# Patient Record
Sex: FEMALE | Race: WHITE | NOT HISPANIC OR LATINO | Employment: PART TIME | URBAN - METROPOLITAN AREA
[De-identification: names, ages, dates, MRNs, and addresses within clinical notes are randomized per-mention and may not be internally consistent; named-entity substitution may affect disease eponyms.]

---

## 2017-01-04 ENCOUNTER — HOSPITAL ENCOUNTER (OUTPATIENT)
Dept: RADIOLOGY | Facility: CLINIC | Age: 32
Discharge: HOME | End: 2017-01-04
Payer: COMMERCIAL

## 2017-01-04 ENCOUNTER — TRANSCRIBE ORDERS (OUTPATIENT)
Dept: RADIOLOGY | Facility: CLINIC | Age: 32
End: 2017-01-04

## 2017-01-04 DIAGNOSIS — M20.12 ACQUIRED HALLUX VALGUS OF LEFT FOOT: ICD-10-CM

## 2017-01-04 DIAGNOSIS — M20.12 ACQUIRED HALLUX VALGUS OF LEFT FOOT: Primary | ICD-10-CM

## 2017-01-04 DIAGNOSIS — M20.11 ACQUIRED HALLUX VALGUS OF RIGHT FOOT: ICD-10-CM

## 2017-01-04 PROCEDURE — 73630 X-RAY EXAM OF FOOT: CPT

## 2017-01-18 ENCOUNTER — ALLSCRIPTS OFFICE VISIT (OUTPATIENT)
Dept: OTHER | Facility: OTHER | Age: 32
End: 2017-01-18

## 2017-02-13 ENCOUNTER — LAB REQUISITION (OUTPATIENT)
Dept: LAB | Facility: HOSPITAL | Age: 32
End: 2017-02-13
Payer: COMMERCIAL

## 2017-02-13 ENCOUNTER — ALLSCRIPTS OFFICE VISIT (OUTPATIENT)
Dept: OTHER | Facility: OTHER | Age: 32
End: 2017-02-13

## 2017-02-13 ENCOUNTER — GENERIC CONVERSION - ENCOUNTER (OUTPATIENT)
Dept: OTHER | Facility: OTHER | Age: 32
End: 2017-02-13

## 2017-02-13 DIAGNOSIS — R68.89 OTHER GENERAL SYMPTOMS AND SIGNS: ICD-10-CM

## 2017-02-13 DIAGNOSIS — R05.9 COUGH: ICD-10-CM

## 2017-02-13 LAB
FLUAV AG SPEC QL IA: NEGATIVE
FLUAV AG SPEC QL: NORMAL
FLUBV AG SPEC QL IA: NEGATIVE
FLUBV AG SPEC QL: NORMAL
RSV B RNA SPEC QL NAA+PROBE: NORMAL

## 2017-02-13 PROCEDURE — 87798 DETECT AGENT NOS DNA AMP: CPT | Performed by: INTERNAL MEDICINE

## 2017-02-13 PROCEDURE — 87400 INFLUENZA A/B EACH AG IA: CPT | Performed by: INTERNAL MEDICINE

## 2017-02-15 RX ORDER — BUTALBITAL, ACETAMINOPHEN AND CAFFEINE 50; 325; 40 MG/1; MG/1; MG/1
TABLET ORAL
COMMUNITY
Start: 2016-08-23 | End: 2020-03-02

## 2017-02-15 RX ORDER — TOPIRAMATE 50 MG/1
TABLET, FILM COATED ORAL
COMMUNITY
Start: 2016-09-19 | End: 2019-10-31 | Stop reason: ALTCHOICE

## 2017-02-17 ENCOUNTER — ANESTHESIA EVENT (OUTPATIENT)
Dept: PERIOP | Facility: HOSPITAL | Age: 32
End: 2017-02-17
Payer: COMMERCIAL

## 2017-02-17 ENCOUNTER — ANESTHESIA (OUTPATIENT)
Dept: PERIOP | Facility: HOSPITAL | Age: 32
End: 2017-02-17
Payer: COMMERCIAL

## 2017-02-17 ENCOUNTER — HOSPITAL ENCOUNTER (OUTPATIENT)
Facility: HOSPITAL | Age: 32
Setting detail: OUTPATIENT SURGERY
Discharge: HOME | End: 2017-02-17
Attending: PODIATRIST | Admitting: PODIATRIST
Payer: COMMERCIAL

## 2017-02-17 ENCOUNTER — APPOINTMENT (OUTPATIENT)
Dept: RADIOLOGY | Facility: HOSPITAL | Age: 32
End: 2017-02-17
Payer: COMMERCIAL

## 2017-02-17 VITALS
RESPIRATION RATE: 18 BRPM | SYSTOLIC BLOOD PRESSURE: 118 MMHG | DIASTOLIC BLOOD PRESSURE: 70 MMHG | BODY MASS INDEX: 23 KG/M2 | OXYGEN SATURATION: 100 % | HEIGHT: 62 IN | HEART RATE: 72 BPM | TEMPERATURE: 98.7 F | WEIGHT: 125 LBS

## 2017-02-17 DIAGNOSIS — M20.12 ACQUIRED HALLUX VALGUS OF LEFT FOOT: ICD-10-CM

## 2017-02-17 LAB — EXT PREGNANCY TEST URINE: NEGATIVE

## 2017-02-17 PROCEDURE — 88304 TISSUE EXAM BY PATHOLOGIST: CPT | Performed by: PODIATRIST

## 2017-02-17 PROCEDURE — 73630 X-RAY EXAM OF FOOT: CPT

## 2017-02-17 PROCEDURE — 81025 URINE PREGNANCY TEST: CPT | Performed by: PODIATRIST

## 2017-02-17 PROCEDURE — C1713 ANCHOR/SCREW BN/BN,TIS/BN: HCPCS | Performed by: PODIATRIST

## 2017-02-17 PROCEDURE — C1769 GUIDE WIRE: HCPCS | Performed by: PODIATRIST

## 2017-02-17 DEVICE — SCREW COMP 3.5 X 22MM MINI FT: Type: IMPLANTABLE DEVICE | Status: FUNCTIONAL

## 2017-02-17 DEVICE — IMPLANTABLE DEVICE: Type: IMPLANTABLE DEVICE | Status: FUNCTIONAL

## 2017-02-17 RX ORDER — PROPOFOL 10 MG/ML
INJECTION, EMULSION INTRAVENOUS AS NEEDED
Status: DISCONTINUED | OUTPATIENT
Start: 2017-02-17 | End: 2017-02-17 | Stop reason: SURG

## 2017-02-17 RX ORDER — BUPIVACAINE HYDROCHLORIDE 2.5 MG/ML
INJECTION, SOLUTION EPIDURAL; INFILTRATION; INTRACAUDAL AS NEEDED
Status: DISCONTINUED | OUTPATIENT
Start: 2017-02-17 | End: 2017-02-17 | Stop reason: HOSPADM

## 2017-02-17 RX ORDER — SODIUM CHLORIDE, SODIUM LACTATE, POTASSIUM CHLORIDE, CALCIUM CHLORIDE 600; 310; 30; 20 MG/100ML; MG/100ML; MG/100ML; MG/100ML
INJECTION, SOLUTION INTRAVENOUS CONTINUOUS PRN
Status: DISCONTINUED | OUTPATIENT
Start: 2017-02-17 | End: 2017-02-17 | Stop reason: SURG

## 2017-02-17 RX ORDER — ACETAMINOPHEN 325 MG/1
650 TABLET ORAL EVERY 4 HOURS PRN
Status: DISCONTINUED | OUTPATIENT
Start: 2017-02-17 | End: 2017-02-17 | Stop reason: HOSPADM

## 2017-02-17 RX ORDER — SODIUM CHLORIDE, SODIUM LACTATE, POTASSIUM CHLORIDE, CALCIUM CHLORIDE 600; 310; 30; 20 MG/100ML; MG/100ML; MG/100ML; MG/100ML
20 INJECTION, SOLUTION INTRAVENOUS CONTINUOUS
Status: DISCONTINUED | OUTPATIENT
Start: 2017-02-17 | End: 2017-02-17 | Stop reason: HOSPADM

## 2017-02-17 RX ORDER — MIDAZOLAM HYDROCHLORIDE 1 MG/ML
INJECTION INTRAMUSCULAR; INTRAVENOUS AS NEEDED
Status: DISCONTINUED | OUTPATIENT
Start: 2017-02-17 | End: 2017-02-17 | Stop reason: SURG

## 2017-02-17 RX ORDER — ONDANSETRON 2 MG/ML
4 INJECTION INTRAMUSCULAR; INTRAVENOUS EVERY 6 HOURS PRN
Status: DISCONTINUED | OUTPATIENT
Start: 2017-02-17 | End: 2017-02-17 | Stop reason: HOSPADM

## 2017-02-17 RX ORDER — CLINDAMYCIN PHOSPHATE 900 MG/50ML
900 INJECTION INTRAVENOUS ONCE
Status: DISCONTINUED | OUTPATIENT
Start: 2017-02-17 | End: 2017-02-17 | Stop reason: HOSPADM

## 2017-02-17 RX ORDER — PROPOFOL 10 MG/ML
INJECTION, EMULSION INTRAVENOUS CONTINUOUS PRN
Status: DISCONTINUED | OUTPATIENT
Start: 2017-02-17 | End: 2017-02-17 | Stop reason: SURG

## 2017-02-17 RX ORDER — FENTANYL CITRATE 50 UG/ML
INJECTION, SOLUTION INTRAMUSCULAR; INTRAVENOUS AS NEEDED
Status: DISCONTINUED | OUTPATIENT
Start: 2017-02-17 | End: 2017-02-17 | Stop reason: SURG

## 2017-02-17 RX ORDER — FENTANYL CITRATE/PF 50 MCG/ML
25 SYRINGE (ML) INJECTION
Status: DISCONTINUED | OUTPATIENT
Start: 2017-02-17 | End: 2017-02-17 | Stop reason: HOSPADM

## 2017-02-17 RX ORDER — CLINDAMYCIN PHOSPHATE 150 MG/ML
INJECTION, SOLUTION INTRAVENOUS AS NEEDED
Status: DISCONTINUED | OUTPATIENT
Start: 2017-02-17 | End: 2017-02-17 | Stop reason: SURG

## 2017-02-17 RX ORDER — ONDANSETRON 2 MG/ML
INJECTION INTRAMUSCULAR; INTRAVENOUS AS NEEDED
Status: DISCONTINUED | OUTPATIENT
Start: 2017-02-17 | End: 2017-02-17 | Stop reason: SURG

## 2017-02-17 RX ORDER — OXYCODONE HYDROCHLORIDE 5 MG/1
10 TABLET ORAL EVERY 6 HOURS PRN
Status: DISCONTINUED | OUTPATIENT
Start: 2017-02-17 | End: 2017-02-17 | Stop reason: HOSPADM

## 2017-02-17 RX ORDER — OXYCODONE HYDROCHLORIDE AND ACETAMINOPHEN 5; 325 MG/1; MG/1
TABLET ORAL
Qty: 30 TABLET | Refills: 0 | Status: SHIPPED | OUTPATIENT
Start: 2017-02-17 | End: 2019-10-31 | Stop reason: ALTCHOICE

## 2017-02-17 RX ORDER — OXYCODONE HYDROCHLORIDE 5 MG/1
5 TABLET ORAL EVERY 4 HOURS PRN
Status: DISCONTINUED | OUTPATIENT
Start: 2017-02-17 | End: 2017-02-17 | Stop reason: HOSPADM

## 2017-02-17 RX ORDER — ASPIRIN 325 MG
325 TABLET, DELAYED RELEASE (ENTERIC COATED) ORAL 2 TIMES DAILY
Qty: 60 TABLET | Refills: 0 | Status: SHIPPED | OUTPATIENT
Start: 2017-02-17 | End: 2019-10-31 | Stop reason: ALTCHOICE

## 2017-02-17 RX ORDER — CETIRIZINE HYDROCHLORIDE 10 MG/1
10 TABLET ORAL DAILY
COMMUNITY
End: 2019-10-31 | Stop reason: ALTCHOICE

## 2017-02-17 RX ORDER — KETOROLAC TROMETHAMINE 30 MG/ML
INJECTION, SOLUTION INTRAMUSCULAR; INTRAVENOUS AS NEEDED
Status: DISCONTINUED | OUTPATIENT
Start: 2017-02-17 | End: 2017-02-17 | Stop reason: SURG

## 2017-02-17 RX ADMIN — ONDANSETRON 4 MG: 2 INJECTION INTRAMUSCULAR; INTRAVENOUS at 16:02

## 2017-02-17 RX ADMIN — KETOROLAC TROMETHAMINE 30 MG: 30 INJECTION, SOLUTION INTRAMUSCULAR at 16:02

## 2017-02-17 RX ADMIN — MIDAZOLAM HYDROCHLORIDE 2 MG: 1 INJECTION, SOLUTION INTRAMUSCULAR; INTRAVENOUS at 13:55

## 2017-02-17 RX ADMIN — MIDAZOLAM HYDROCHLORIDE 1 MG: 1 INJECTION, SOLUTION INTRAMUSCULAR; INTRAVENOUS at 14:32

## 2017-02-17 RX ADMIN — SODIUM CHLORIDE, SODIUM LACTATE, POTASSIUM CHLORIDE, AND CALCIUM CHLORIDE: .6; .31; .03; .02 INJECTION, SOLUTION INTRAVENOUS at 13:56

## 2017-02-17 RX ADMIN — FENTANYL CITRATE 50 MCG: 50 INJECTION, SOLUTION INTRAMUSCULAR; INTRAVENOUS at 14:22

## 2017-02-17 RX ADMIN — CLINDAMYCIN PHOSPHATE 900 MG: 150 INJECTION, SOLUTION INTRAMUSCULAR; INTRAVENOUS at 14:21

## 2017-02-17 RX ADMIN — FENTANYL CITRATE 50 MCG: 50 INJECTION, SOLUTION INTRAMUSCULAR; INTRAVENOUS at 14:24

## 2017-02-17 RX ADMIN — PROPOFOL 20 MG: 10 INJECTION, EMULSION INTRAVENOUS at 14:31

## 2017-02-17 RX ADMIN — PROPOFOL 80 MCG/KG/MIN: 10 INJECTION, EMULSION INTRAVENOUS at 14:24

## 2017-02-17 RX ADMIN — MIDAZOLAM HYDROCHLORIDE 1 MG: 1 INJECTION, SOLUTION INTRAMUSCULAR; INTRAVENOUS at 14:27

## 2017-02-17 RX ADMIN — PROPOFOL 30 MG: 10 INJECTION, EMULSION INTRAVENOUS at 14:32

## 2017-03-06 ENCOUNTER — HOSPITAL ENCOUNTER (OUTPATIENT)
Dept: RADIOLOGY | Facility: CLINIC | Age: 32
Discharge: HOME | End: 2017-03-06
Payer: COMMERCIAL

## 2017-03-06 ENCOUNTER — TRANSCRIBE ORDERS (OUTPATIENT)
Dept: ADMINISTRATIVE | Facility: HOSPITAL | Age: 32
End: 2017-03-06

## 2017-03-06 DIAGNOSIS — M20.12 ACQUIRED HALLUX VALGUS OF LEFT FOOT: Primary | ICD-10-CM

## 2017-03-06 DIAGNOSIS — M20.12 ACQUIRED HALLUX VALGUS OF LEFT FOOT: ICD-10-CM

## 2017-03-06 PROCEDURE — 73630 X-RAY EXAM OF FOOT: CPT

## 2017-03-29 ENCOUNTER — HOSPITAL ENCOUNTER (OUTPATIENT)
Dept: RADIOLOGY | Facility: CLINIC | Age: 32
Discharge: HOME | End: 2017-03-29
Payer: COMMERCIAL

## 2017-03-29 ENCOUNTER — TRANSCRIBE ORDERS (OUTPATIENT)
Dept: RADIOLOGY | Facility: CLINIC | Age: 32
End: 2017-03-29

## 2017-03-29 DIAGNOSIS — M20.12 ACQUIRED HALLUX VALGUS OF LEFT FOOT: ICD-10-CM

## 2017-03-29 DIAGNOSIS — M20.12 ACQUIRED HALLUX VALGUS OF LEFT FOOT: Primary | ICD-10-CM

## 2017-03-29 PROCEDURE — 73630 X-RAY EXAM OF FOOT: CPT

## 2017-03-31 ENCOUNTER — GENERIC CONVERSION - ENCOUNTER (OUTPATIENT)
Dept: OTHER | Facility: OTHER | Age: 32
End: 2017-03-31

## 2017-04-19 ENCOUNTER — TRANSCRIBE ORDERS (OUTPATIENT)
Dept: RADIOLOGY | Facility: CLINIC | Age: 32
End: 2017-04-19

## 2017-04-19 ENCOUNTER — HOSPITAL ENCOUNTER (OUTPATIENT)
Dept: RADIOLOGY | Facility: CLINIC | Age: 32
Discharge: HOME | End: 2017-04-19
Payer: COMMERCIAL

## 2017-04-19 DIAGNOSIS — M20.12: ICD-10-CM

## 2017-04-19 DIAGNOSIS — M20.12: Primary | ICD-10-CM

## 2017-04-19 PROCEDURE — 73630 X-RAY EXAM OF FOOT: CPT

## 2017-05-10 ENCOUNTER — HOSPITAL ENCOUNTER (OUTPATIENT)
Dept: RADIOLOGY | Facility: CLINIC | Age: 32
Discharge: HOME | End: 2017-05-10
Payer: COMMERCIAL

## 2017-05-10 PROCEDURE — 73630 X-RAY EXAM OF FOOT: CPT

## 2017-08-15 ENCOUNTER — TRANSCRIBE ORDERS (OUTPATIENT)
Dept: ADMINISTRATIVE | Facility: HOSPITAL | Age: 32
End: 2017-08-15

## 2017-08-15 DIAGNOSIS — Z34.90 PRENATAL CARE, UNSPECIFIED TRIMESTER: Primary | ICD-10-CM

## 2017-08-19 ENCOUNTER — HOSPITAL ENCOUNTER (OUTPATIENT)
Dept: ULTRASOUND IMAGING | Facility: HOSPITAL | Age: 32
Discharge: HOME | End: 2017-08-19
Payer: COMMERCIAL

## 2017-08-19 DIAGNOSIS — Z34.90 PRENATAL CARE, UNSPECIFIED TRIMESTER: ICD-10-CM

## 2017-08-19 PROCEDURE — 76801 OB US < 14 WKS SINGLE FETUS: CPT

## 2017-09-17 ENCOUNTER — OFFICE VISIT (OUTPATIENT)
Dept: URGENT CARE | Facility: CLINIC | Age: 32
End: 2017-09-17
Payer: COMMERCIAL

## 2017-09-17 PROCEDURE — 99203 OFFICE O/P NEW LOW 30 MIN: CPT

## 2017-10-10 ENCOUNTER — TRANSCRIBE ORDERS (OUTPATIENT)
Dept: ADMINISTRATIVE | Facility: HOSPITAL | Age: 32
End: 2017-10-10

## 2017-10-10 DIAGNOSIS — Z34.82 PRENATAL CARE, SUBSEQUENT PREGNANCY, SECOND TRIMESTER: Primary | ICD-10-CM

## 2017-10-18 ENCOUNTER — GENERIC CONVERSION - ENCOUNTER (OUTPATIENT)
Dept: OTHER | Facility: OTHER | Age: 32
End: 2017-10-18

## 2017-10-31 ENCOUNTER — HOSPITAL ENCOUNTER (OUTPATIENT)
Dept: ULTRASOUND IMAGING | Facility: HOSPITAL | Age: 32
Discharge: HOME | End: 2017-10-31
Payer: COMMERCIAL

## 2017-10-31 DIAGNOSIS — Z34.82 ENCOUNTER FOR SUPERVISION OF OTHER NORMAL PREGNANCY IN SECOND TRIMESTER: ICD-10-CM

## 2017-10-31 DIAGNOSIS — Z34.82 PRENATAL CARE, SUBSEQUENT PREGNANCY, SECOND TRIMESTER: ICD-10-CM

## 2017-10-31 PROCEDURE — 76805 OB US >/= 14 WKS SNGL FETUS: CPT

## 2018-01-14 VITALS
DIASTOLIC BLOOD PRESSURE: 78 MMHG | WEIGHT: 130 LBS | BODY MASS INDEX: 22.2 KG/M2 | HEIGHT: 64 IN | RESPIRATION RATE: 16 BRPM | HEART RATE: 72 BPM | SYSTOLIC BLOOD PRESSURE: 128 MMHG

## 2018-01-14 VITALS
SYSTOLIC BLOOD PRESSURE: 118 MMHG | WEIGHT: 128 LBS | BODY MASS INDEX: 21.85 KG/M2 | RESPIRATION RATE: 16 BRPM | TEMPERATURE: 98.4 F | DIASTOLIC BLOOD PRESSURE: 82 MMHG | HEIGHT: 64 IN

## 2018-01-14 NOTE — RESULT NOTES
Verified Results  (1) RAPID INFLUENZA SCREEN with reflex to PCR [id number] 10:44AM [facility]     Test Name Result Flag Reference   RAPID INFLUENZA A AGN Negative  Negative, Indeterminate   RAPID INFLUENZA B AGN Negative  Negative, Indeterminate

## 2018-02-07 ENCOUNTER — TRANSCRIBE ORDERS (OUTPATIENT)
Dept: FAMILY MEDICINE CLINIC | Facility: HOSPITAL | Age: 33
End: 2018-02-07

## 2018-02-07 DIAGNOSIS — L98.9 SKIN LESION: Primary | ICD-10-CM

## 2018-12-14 DIAGNOSIS — G43.909 MIGRAINE WITHOUT STATUS MIGRAINOSUS, NOT INTRACTABLE, UNSPECIFIED MIGRAINE TYPE: Primary | ICD-10-CM

## 2018-12-18 RX ORDER — BUTALBITAL, ACETAMINOPHEN AND CAFFEINE 50; 325; 40 MG/1; MG/1; MG/1
TABLET ORAL
Qty: 30 TABLET | Refills: 3 | OUTPATIENT
Start: 2018-12-18

## 2019-10-31 ENCOUNTER — OFFICE VISIT (OUTPATIENT)
Dept: FAMILY MEDICINE CLINIC | Facility: HOSPITAL | Age: 34
End: 2019-10-31
Payer: COMMERCIAL

## 2019-10-31 VITALS
WEIGHT: 144 LBS | BODY MASS INDEX: 26.5 KG/M2 | HEIGHT: 62 IN | SYSTOLIC BLOOD PRESSURE: 110 MMHG | RESPIRATION RATE: 16 BRPM | HEART RATE: 71 BPM | DIASTOLIC BLOOD PRESSURE: 82 MMHG

## 2019-10-31 DIAGNOSIS — Z13.0 SCREENING, ANEMIA, DEFICIENCY, IRON: ICD-10-CM

## 2019-10-31 DIAGNOSIS — E66.3 OVERWEIGHT (BMI 25.0-29.9): ICD-10-CM

## 2019-10-31 DIAGNOSIS — Z00.00 HEALTH CARE MAINTENANCE: Primary | ICD-10-CM

## 2019-10-31 DIAGNOSIS — Z13.220 SCREENING, LIPID: ICD-10-CM

## 2019-10-31 DIAGNOSIS — Z13.1 SCREENING FOR DIABETES MELLITUS: ICD-10-CM

## 2019-10-31 DIAGNOSIS — Z23 NEED FOR INFLUENZA VACCINATION: ICD-10-CM

## 2019-10-31 DIAGNOSIS — Z13.29 SCREENING FOR THYROID DISORDER: ICD-10-CM

## 2019-10-31 DIAGNOSIS — G43.009 MIGRAINE WITHOUT AURA AND WITHOUT STATUS MIGRAINOSUS, NOT INTRACTABLE: ICD-10-CM

## 2019-10-31 PROCEDURE — 99395 PREV VISIT EST AGE 18-39: CPT | Performed by: INTERNAL MEDICINE

## 2019-10-31 NOTE — PROGRESS NOTES
Subjective:   Chief Complaint   Patient presents with    Annual Exam        Patient ID: Bahman Shipley is a 29 y o  female  Here for annual preventive visit    Doing well  No significant concerns  Sees gyn for routine care  The following portions of the patient's history were reviewed and updated as appropriate: allergies, current medications, past family history, past medical history, past social history, past surgical history and problem list     Review of Systems   Constitutional: Negative for fatigue and fever  HENT: Negative for hearing loss  Eyes: Negative for visual disturbance  Respiratory: Negative for cough, chest tightness, shortness of breath and wheezing  Cardiovascular: Negative for chest pain, palpitations and leg swelling  Gastrointestinal: Negative for abdominal pain, diarrhea and nausea  Genitourinary: Negative for dysuria and hematuria  Musculoskeletal: Negative for arthralgias  Neurological: Negative for dizziness, numbness and headaches  Psychiatric/Behavioral: Negative for confusion and dysphoric mood  All other systems reviewed and are negative  Current Outpatient Medications on File Prior to Visit   Medication Sig Dispense Refill    butalbital-acetaminophen-caffeine (FIORICET,ESGIC) -40 mg per tablet Take by mouth      Multiple Vitamins-Calcium (ONE-A-DAY WOMENS FORMULA PO) Take by mouth       No current facility-administered medications on file prior to visit  Objective:  Vitals:    10/31/19 1432   BP: 110/82   Pulse: 71   Resp: 16   Weight: 65 3 kg (144 lb)   Height: 5' 2" (1 575 m)      Physical Exam   Constitutional: She is oriented to person, place, and time  She appears well-developed and well-nourished  Eyes: Pupils are equal, round, and reactive to light  Neck: Normal range of motion  Neck supple  No thyromegaly present  Cardiovascular: Normal rate, regular rhythm, normal heart sounds and intact distal pulses     No murmur heard   Pulmonary/Chest: Effort normal and breath sounds normal  She has no wheezes  She has no rales  Abdominal: Soft  Bowel sounds are normal  There is no tenderness  Musculoskeletal: Normal range of motion  She exhibits no edema, tenderness or deformity  Lymphadenopathy:     She has no cervical adenopathy  Neurological: She is alert and oriented to person, place, and time  She has normal reflexes  Skin: Skin is warm and dry  Psychiatric: She has a normal mood and affect  Nursing note and vitals reviewed  Assessment/Plan:    Overweight (BMI 25 0-29  9)  BMI Counseling: Body mass index is 26 34 kg/m²  The BMI is above normal  Nutrition recommendations include reducing portion sizes  Diagnoses and all orders for this visit:    Health care maintenance    Migraine without aura and without status migrainosus, not intractable    Need for influenza vaccination  -     Cancel: influenza vaccine, [id number], quadrivalent, 0 5 mL, preservative-free, for adult and pediatric patients 6 mos+ (AFLURIA, FLUARIX, FLULAVAL, FLUZONE)    Screening for diabetes mellitus  -     Comprehensive metabolic panel; Future  -     Comprehensive metabolic panel    Screening, lipid  -     Lipid Panel with Direct LDL reflex; Future  -     Lipid Panel with Direct LDL reflex    Screening for thyroid disorder  -     TSH, 3rd generation; Future  -     TSH, 3rd generation    Screening, anemia, deficiency, iron  -     CBC and differential; Future  -     CBC and differential    Overweight (BMI 25 0-29  9)

## 2019-11-08 LAB
ALBUMIN SERPL-MCNC: 4.8 G/DL (ref 3.5–5.5)
ALBUMIN/GLOB SERPL: 2.1 {RATIO} (ref 1.2–2.2)
ALP SERPL-CCNC: 50 IU/L (ref 39–117)
ALT SERPL-CCNC: 25 IU/L (ref 0–32)
AST SERPL-CCNC: 23 IU/L (ref 0–40)
BASOPHILS # BLD AUTO: 0 X10E3/UL (ref 0–0.2)
BASOPHILS NFR BLD AUTO: 0 %
BILIRUB SERPL-MCNC: 0.3 MG/DL (ref 0–1.2)
BUN SERPL-MCNC: 15 MG/DL (ref 6–20)
BUN/CREAT SERPL: 18 (ref 9–23)
CALCIUM SERPL-MCNC: 9.5 MG/DL (ref 8.7–10.2)
CHLORIDE SERPL-SCNC: 102 MMOL/L (ref 96–106)
CHOLEST SERPL-MCNC: 229 MG/DL (ref 100–199)
CO2 SERPL-SCNC: 25 MMOL/L (ref 20–29)
CREAT SERPL-MCNC: 0.84 MG/DL (ref 0.57–1)
EOSINOPHIL # BLD AUTO: 0.1 X10E3/UL (ref 0–0.4)
EOSINOPHIL NFR BLD AUTO: 2 %
ERYTHROCYTE [DISTWIDTH] IN BLOOD BY AUTOMATED COUNT: 12.9 % (ref 12.3–15.4)
GLOBULIN SER-MCNC: 2.3 G/DL (ref 1.5–4.5)
GLUCOSE SERPL-MCNC: 88 MG/DL (ref 65–99)
HCT VFR BLD AUTO: 40.8 % (ref 34–46.6)
HDLC SERPL-MCNC: 90 MG/DL
HGB BLD-MCNC: 13.5 G/DL (ref 11.1–15.9)
IMM GRANULOCYTES # BLD: 0 X10E3/UL (ref 0–0.1)
IMM GRANULOCYTES NFR BLD: 0 %
LDLC SERPL CALC-MCNC: 127 MG/DL (ref 0–99)
LDLC/HDLC SERPL: 1.4 RATIO (ref 0–3.2)
LYMPHOCYTES # BLD AUTO: 2.3 X10E3/UL (ref 0.7–3.1)
LYMPHOCYTES NFR BLD AUTO: 43 %
MCH RBC QN AUTO: 28.7 PG (ref 26.6–33)
MCHC RBC AUTO-ENTMCNC: 33.1 G/DL (ref 31.5–35.7)
MCV RBC AUTO: 87 FL (ref 79–97)
MONOCYTES # BLD AUTO: 0.3 X10E3/UL (ref 0.1–0.9)
MONOCYTES NFR BLD AUTO: 6 %
NEUTROPHILS # BLD AUTO: 2.6 X10E3/UL (ref 1.4–7)
NEUTROPHILS NFR BLD AUTO: 49 %
PLATELET # BLD AUTO: 320 X10E3/UL (ref 150–450)
POTASSIUM SERPL-SCNC: 4.6 MMOL/L (ref 3.5–5.2)
PROT SERPL-MCNC: 7.1 G/DL (ref 6–8.5)
RBC # BLD AUTO: 4.71 X10E6/UL (ref 3.77–5.28)
SL AMB EGFR AFRICAN AMERICAN: 106 ML/MIN/1.73
SL AMB EGFR NON AFRICAN AMERICAN: 92 ML/MIN/1.73
SL AMB VLDL CHOLESTEROL CALC: 12 MG/DL (ref 5–40)
SODIUM SERPL-SCNC: 140 MMOL/L (ref 134–144)
TRIGL SERPL-MCNC: 60 MG/DL (ref 0–149)
TSH SERPL DL<=0.005 MIU/L-ACNC: 1.2 UIU/ML (ref 0.45–4.5)
WBC # BLD AUTO: 5.4 X10E3/UL (ref 3.4–10.8)

## 2019-12-30 PROBLEM — E66.3 OVERWEIGHT (BMI 25.0-29.9): Status: ACTIVE | Noted: 2019-12-30

## 2019-12-31 NOTE — ASSESSMENT & PLAN NOTE
BMI Counseling: Body mass index is 26 34 kg/m²  The BMI is above normal  Nutrition recommendations include reducing portion sizes

## 2020-02-04 PROBLEM — K13.21 LEUKOPLAKIA OF ORAL MUCOSA, INCLUDING TONGUE: Status: ACTIVE | Noted: 2020-02-04

## 2020-03-02 ENCOUNTER — ANESTHESIA EVENT (OUTPATIENT)
Dept: PERIOP | Facility: HOSPITAL | Age: 35
End: 2020-03-02
Payer: COMMERCIAL

## 2020-03-02 NOTE — PRE-PROCEDURE INSTRUCTIONS
Pre-Surgery Instructions:   Medication Instructions    Multiple Vitamins-Calcium (ONE-A-DAY WOMENS FORMULA PO) Instructed patient per Anesthesia Guidelines  Spoke to pt  Medication list reviewed & instructed   Pt reported she already stopped MV as of 2/29  As of 3/2 instructed on tylenol only   Am DOS no meds   Showering instructions reviewed @ time of call   All instructions verbally understood by patient   No further questions   Pt has questions regarding GA, forwarded to anesthesia

## 2020-03-04 ENCOUNTER — ANESTHESIA (OUTPATIENT)
Dept: PERIOP | Facility: HOSPITAL | Age: 35
End: 2020-03-04
Payer: COMMERCIAL

## 2020-03-04 ENCOUNTER — HOSPITAL ENCOUNTER (OUTPATIENT)
Facility: HOSPITAL | Age: 35
Setting detail: OUTPATIENT SURGERY
Discharge: HOME | End: 2020-03-04
Attending: OTOLARYNGOLOGY | Admitting: OTOLARYNGOLOGY
Payer: COMMERCIAL

## 2020-03-04 VITALS
OXYGEN SATURATION: 100 % | HEIGHT: 62 IN | DIASTOLIC BLOOD PRESSURE: 71 MMHG | HEART RATE: 93 BPM | TEMPERATURE: 99 F | RESPIRATION RATE: 18 BRPM | BODY MASS INDEX: 26.68 KG/M2 | SYSTOLIC BLOOD PRESSURE: 138 MMHG | WEIGHT: 145 LBS

## 2020-03-04 DIAGNOSIS — K13.21 LEUKOPLAKIA OF ORAL MUCOSA, INCLUDING TONGUE: ICD-10-CM

## 2020-03-04 LAB
EXT PREGNANCY TEST URINE: NEGATIVE
EXT. CONTROL: NORMAL

## 2020-03-04 PROCEDURE — 88305 TISSUE EXAM BY PATHOLOGIST: CPT | Performed by: PATHOLOGY

## 2020-03-04 PROCEDURE — 41120 PARTIAL REMOVAL OF TONGUE: CPT | Performed by: OTOLARYNGOLOGY

## 2020-03-04 PROCEDURE — 88331 PATH CONSLTJ SURG 1 BLK 1SPC: CPT | Performed by: PATHOLOGY

## 2020-03-04 PROCEDURE — 88331 PATH CONSLTJ SURG 1 BLK 1SPC: CPT | Performed by: OTOLARYNGOLOGY

## 2020-03-04 PROCEDURE — 81025 URINE PREGNANCY TEST: CPT | Performed by: OTOLARYNGOLOGY

## 2020-03-04 RX ORDER — LIDOCAINE HYDROCHLORIDE 10 MG/ML
INJECTION, SOLUTION EPIDURAL; INFILTRATION; INTRACAUDAL; PERINEURAL AS NEEDED
Status: DISCONTINUED | OUTPATIENT
Start: 2020-03-04 | End: 2020-03-04 | Stop reason: SURG

## 2020-03-04 RX ORDER — IBUPROFEN 400 MG/1
600 TABLET ORAL EVERY 6 HOURS PRN
Status: DISCONTINUED | OUTPATIENT
Start: 2020-03-04 | End: 2020-03-04 | Stop reason: HOSPADM

## 2020-03-04 RX ORDER — OXYCODONE HYDROCHLORIDE AND ACETAMINOPHEN 5; 325 MG/1; MG/1
1 TABLET ORAL EVERY 4 HOURS PRN
Status: DISCONTINUED | OUTPATIENT
Start: 2020-03-04 | End: 2020-03-04 | Stop reason: HOSPADM

## 2020-03-04 RX ORDER — KETOROLAC TROMETHAMINE 30 MG/ML
INJECTION, SOLUTION INTRAMUSCULAR; INTRAVENOUS AS NEEDED
Status: DISCONTINUED | OUTPATIENT
Start: 2020-03-04 | End: 2020-03-04 | Stop reason: SURG

## 2020-03-04 RX ORDER — SODIUM CHLORIDE, SODIUM LACTATE, POTASSIUM CHLORIDE, CALCIUM CHLORIDE 600; 310; 30; 20 MG/100ML; MG/100ML; MG/100ML; MG/100ML
INJECTION, SOLUTION INTRAVENOUS CONTINUOUS PRN
Status: DISCONTINUED | OUTPATIENT
Start: 2020-03-04 | End: 2020-03-04 | Stop reason: SURG

## 2020-03-04 RX ORDER — SODIUM CHLORIDE, SODIUM LACTATE, POTASSIUM CHLORIDE, CALCIUM CHLORIDE 600; 310; 30; 20 MG/100ML; MG/100ML; MG/100ML; MG/100ML
125 INJECTION, SOLUTION INTRAVENOUS CONTINUOUS
Status: DISCONTINUED | OUTPATIENT
Start: 2020-03-04 | End: 2020-03-04 | Stop reason: HOSPADM

## 2020-03-04 RX ORDER — GLYCOPYRROLATE 0.2 MG/ML
INJECTION INTRAMUSCULAR; INTRAVENOUS AS NEEDED
Status: DISCONTINUED | OUTPATIENT
Start: 2020-03-04 | End: 2020-03-04 | Stop reason: SURG

## 2020-03-04 RX ORDER — CHLORHEXIDINE GLUCONATE 0.12 MG/ML
15 RINSE ORAL 2 TIMES DAILY
Qty: 473 ML | Refills: 2 | Status: SHIPPED | OUTPATIENT
Start: 2020-03-04 | End: 2020-06-05

## 2020-03-04 RX ORDER — OXYCODONE HYDROCHLORIDE AND ACETAMINOPHEN 5; 325 MG/1; MG/1
1 TABLET ORAL EVERY 4 HOURS PRN
Qty: 25 TABLET | Refills: 0 | Status: SHIPPED | OUTPATIENT
Start: 2020-03-04 | End: 2020-03-14

## 2020-03-04 RX ORDER — PROPOFOL 10 MG/ML
INJECTION, EMULSION INTRAVENOUS AS NEEDED
Status: DISCONTINUED | OUTPATIENT
Start: 2020-03-04 | End: 2020-03-04 | Stop reason: SURG

## 2020-03-04 RX ORDER — ONDANSETRON 2 MG/ML
4 INJECTION INTRAMUSCULAR; INTRAVENOUS ONCE
Status: COMPLETED | OUTPATIENT
Start: 2020-03-04 | End: 2020-03-04

## 2020-03-04 RX ORDER — NEOSTIGMINE METHYLSULFATE 1 MG/ML
INJECTION INTRAVENOUS AS NEEDED
Status: DISCONTINUED | OUTPATIENT
Start: 2020-03-04 | End: 2020-03-04 | Stop reason: SURG

## 2020-03-04 RX ORDER — ROCURONIUM BROMIDE 10 MG/ML
INJECTION, SOLUTION INTRAVENOUS AS NEEDED
Status: DISCONTINUED | OUTPATIENT
Start: 2020-03-04 | End: 2020-03-04 | Stop reason: SURG

## 2020-03-04 RX ORDER — FENTANYL CITRATE 50 UG/ML
INJECTION, SOLUTION INTRAMUSCULAR; INTRAVENOUS AS NEEDED
Status: DISCONTINUED | OUTPATIENT
Start: 2020-03-04 | End: 2020-03-04 | Stop reason: SURG

## 2020-03-04 RX ORDER — FENTANYL CITRATE/PF 50 MCG/ML
25 SYRINGE (ML) INJECTION
Status: DISCONTINUED | OUTPATIENT
Start: 2020-03-04 | End: 2020-03-04 | Stop reason: HOSPADM

## 2020-03-04 RX ORDER — LIDOCAINE HYDROCHLORIDE AND EPINEPHRINE 10; 10 MG/ML; UG/ML
INJECTION, SOLUTION INFILTRATION; PERINEURAL AS NEEDED
Status: DISCONTINUED | OUTPATIENT
Start: 2020-03-04 | End: 2020-03-04 | Stop reason: HOSPADM

## 2020-03-04 RX ORDER — LIDOCAINE HYDROCHLORIDE 10 MG/ML
0.5 INJECTION, SOLUTION EPIDURAL; INFILTRATION; INTRACAUDAL; PERINEURAL ONCE AS NEEDED
Status: COMPLETED | OUTPATIENT
Start: 2020-03-04 | End: 2020-03-04

## 2020-03-04 RX ADMIN — PROPOFOL 30 MG: 10 INJECTION, EMULSION INTRAVENOUS at 11:04

## 2020-03-04 RX ADMIN — SODIUM CHLORIDE, SODIUM LACTATE, POTASSIUM CHLORIDE, AND CALCIUM CHLORIDE: .6; .31; .03; .02 INJECTION, SOLUTION INTRAVENOUS at 11:04

## 2020-03-04 RX ADMIN — PHENYLEPHRINE HYDROCHLORIDE 200 MCG: 10 INJECTION INTRAVENOUS at 11:44

## 2020-03-04 RX ADMIN — PHENYLEPHRINE HYDROCHLORIDE 200 MCG: 10 INJECTION INTRAVENOUS at 11:49

## 2020-03-04 RX ADMIN — NEOSTIGMINE METHYLSULFATE 2 MG: 1 INJECTION INTRAVENOUS at 12:01

## 2020-03-04 RX ADMIN — SODIUM CHLORIDE, SODIUM LACTATE, POTASSIUM CHLORIDE, AND CALCIUM CHLORIDE 125 ML/HR: .6; .31; .03; .02 INJECTION, SOLUTION INTRAVENOUS at 12:22

## 2020-03-04 RX ADMIN — OXYCODONE HYDROCHLORIDE AND ACETAMINOPHEN 1 TABLET: 5; 325 TABLET ORAL at 13:10

## 2020-03-04 RX ADMIN — FENTANYL CITRATE 25 MCG: 50 INJECTION, SOLUTION INTRAMUSCULAR; INTRAVENOUS at 12:31

## 2020-03-04 RX ADMIN — KETOROLAC TROMETHAMINE 30 MG: 30 INJECTION, SOLUTION INTRAMUSCULAR at 12:03

## 2020-03-04 RX ADMIN — SODIUM CHLORIDE, SODIUM LACTATE, POTASSIUM CHLORIDE, AND CALCIUM CHLORIDE 125 ML/HR: .6; .31; .03; .02 INJECTION, SOLUTION INTRAVENOUS at 10:20

## 2020-03-04 RX ADMIN — LIDOCAINE HYDROCHLORIDE 0.5 ML: 10 INJECTION, SOLUTION EPIDURAL; INFILTRATION; INTRACAUDAL; PERINEURAL at 10:20

## 2020-03-04 RX ADMIN — FENTANYL CITRATE 25 MCG: 50 INJECTION, SOLUTION INTRAMUSCULAR; INTRAVENOUS at 12:35

## 2020-03-04 RX ADMIN — LIDOCAINE HYDROCHLORIDE 6 ML: 10 INJECTION, SOLUTION EPIDURAL; INFILTRATION; INTRACAUDAL; PERINEURAL at 11:04

## 2020-03-04 RX ADMIN — PHENYLEPHRINE HYDROCHLORIDE 150 MCG: 10 INJECTION INTRAVENOUS at 11:31

## 2020-03-04 RX ADMIN — GLYCOPYRROLATE 0.4 MG: 0.2 INJECTION, SOLUTION INTRAMUSCULAR; INTRAVENOUS at 12:01

## 2020-03-04 RX ADMIN — ROCURONIUM BROMIDE 30 MG: 50 INJECTION, SOLUTION INTRAVENOUS at 11:04

## 2020-03-04 RX ADMIN — PHENYLEPHRINE HYDROCHLORIDE 150 MCG: 10 INJECTION INTRAVENOUS at 11:37

## 2020-03-04 RX ADMIN — ONDANSETRON 4 MG: 2 INJECTION INTRAMUSCULAR; INTRAVENOUS at 12:21

## 2020-03-04 RX ADMIN — FENTANYL CITRATE 100 MCG: 50 INJECTION, SOLUTION INTRAMUSCULAR; INTRAVENOUS at 11:04

## 2020-03-04 NOTE — H&P
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  Plan:  OR for excisional biopsy and CO2 laser ablation    History of Present Illness   HPI:  Felisa Christy is a 29 y o  female who presents with R lateral tongue leukoplakia  Review of Systems    Historical Information   Past Medical History:   Diagnosis Date    Acute sinusitis     presently being treated     Hair loss     Mental health problem     Resolved 11/15/2016     Migraines     Vision changes     Resolved 1/18/2017      Past Surgical History:   Procedure Laterality Date    FOOT OSTEOTOMY Left 2/17/2017    Procedure: OSTECTOMY 1ST METATARSAL;  Surgeon: Neel Sy DPM;  Location:  MAIN OR;  Service:     TOE OSTEOTOMY Left 2/17/2017    Procedure: CLOSING BASE WEDGE OSTEOTOMY 1ST METATARSAL: APPLICATION OF SHORT LEG SPLINT;APPLICTION AND ACTIVATION OF BONE STIMULATOR;  Surgeon: Neel Sy DPM;  Location:  MAIN OR;  Service:    [employer] VAGINAL DELIVERY      X 1    WISDOM TOOTH EXTRACTION      X 4     Social History   Social History     Substance and Sexual Activity   Alcohol Use Yes    Frequency: 2-4 times a month    Comment: occasionally     Social History     Substance and Sexual Activity   Drug Use No     Social History     Tobacco Use   Smoking Status Former Smoker   Smokeless Tobacco Never Used   Tobacco Comment    former, quit 16     E-Cigarette/Vaping     E-Cigarette/Vaping Substances     Family History: non-contributory    Meds/Allergies   all medications and allergies reviewed  Allergies   Allergen Reactions    Penicillins Hives       Objective   Vitals: Blood pressure 128/73, pulse 99, temperature 99 °F (37 2 °C), temperature source Tympanic, resp  rate 16, height 5' 2" (1 575 m), weight 65 8 kg (145 lb), SpO2 100 %      No intake or output data in the 24 hours ending 03/04/20 1048    Invasive Devices     Peripheral Intravenous Line            Peripheral IV 03/04/20 Left Antecubital less than 1 day                Physical Exam   NAD  AAOx3  CTAB  RRR  Abd soft NT/ND  MAHER    TM/EAC clear bilaterally  OC/OP R lateral tongue leukoplakia  Neck supple without lymph adenopathy  Nares clear on anterior rhinoscopy      Lab Results: I have personally reviewed pertinent lab results  Imaging: I have personally reviewed pertinent reports  EKG, Pathology, and Other Studies: I have personally reviewed pertinent reports  Code Status: No Order  Advance Directive and Living Will:      Power of :    POLST:      Counseling/Coordination of Care: Total floor / unit time spent today 25 minutes  Greater than 50% of total time was spent with the patient and / or family counseling and / or coordination of care   A description of the counseling / coordination of care: 25

## 2020-03-04 NOTE — ANESTHESIA POSTPROCEDURE EVALUATION
Post-Op Assessment Note    CV Status:  Stable    Pain management: adequate     Mental Status:  Alert and awake   Hydration Status:  Euvolemic   PONV Controlled:  Controlled   Airway Patency:  Patent   Post Op Vitals Reviewed: Yes      Staff: AnesthesiologistSOCRATES           BP (P) 130/74 (03/04/20 1209)    Temp (!) (P) 97 3 °F (36 3 °C) (03/04/20 1209)    Pulse (!) (P) 120 (03/04/20 1209)   Resp (P) 16 (03/04/20 1209)    SpO2 (P) 100 % (03/04/20 1209)

## 2020-03-04 NOTE — ANESTHESIA PREPROCEDURE EVALUATION
Review of Systems/Medical History  Patient summary reviewed  Chart reviewed  No history of anesthetic complications     Cardiovascular  Negative cardio ROS    Pulmonary  Negative pulmonary ROS        GI/Hepatic       Negative  ROS        Endo/Other  Negative endo/other ROS      GYN  Not currently pregnant ,          Hematology  Negative hematology ROS      Musculoskeletal  Negative musculoskeletal ROS        Neurology    Headaches,    Psychology   Negative psychology ROS              Physical Exam    Airway    Mallampati score: I  TM Distance: >3 FB  Neck ROM: full     Dental   No notable dental hx     Cardiovascular  Comment: Negative ROS,     Pulmonary      Other Findings        Anesthesia Plan  ASA Score- 2     Anesthesia Type- general with ASA Monitors  Additional Monitors:   Airway Plan: ETT  Comment: Plan a careful discussion of airway fire risk in the OR  Plan Factors-  Patient did not smoke on day of surgery  Induction- intravenous  Postoperative Plan-     Informed Consent- Anesthetic plan and risks discussed with patient  I personally reviewed this patient with the CRNA  Discussed and agreed on the Anesthesia Plan with the CRNA  Max Rose

## 2020-03-04 NOTE — DISCHARGE INSTRUCTIONS
Partial Glossectomy    Office [address] [place] [address]  Dr Beatriz Belle Cell       WHAT YOU SHOULD KNOW:    An excisional biopsy/neck dissection is a surgical procedure to remove a lump, tumor or mass  The sample will be sent to a lab for testing  AFTER YOU LEAVE:    Medicines:   · Pain medicine: You may be given a prescription medicine to decrease pain  Do not wait until the pain is severe before you take this medicine  · Take your medicine as directed  Call your healthcare provider if you think your medicine is not helping or if you have side effects  Tell him if you are allergic to any medicine  Keep a list of the medicines, vitamins, and herbs you take  Include the amounts, and when and why you take them  Bring the list or the pill bottles to follow-up visits  Carry your medicine list with you in case of an emergency  Follow up with Dr Beatriz Belle as directed: You will need to return to have your stitches removed  Write down your questions so you remember to ask them during your visits  Wound care:  Leave the stitches or surgical adhesive tape in place  You may shower or bathe as you wish  Tongue resection: If you have undergone a partial resection of the tongue, please rinse with half strength hydrogen peroxide three times daily    Ice:  Ice helps decrease swelling and pain  Use an ice pack, or put crushed ice in a plastic bag  Cover it with a towel and place it over your wound for 15 to 20 minutes every hour if needed  Self-care:    Return to daily activities: You may be able to return to most of your normal activities the day after your procedure  Ask for more information about the activities you should avoid      · Do not smoke: If you smoke, it is never too late to quit  Smoking can affect how well your wound will heal  Ask for information if you need help quitting  Contact your primary healthcare provider if:   · You have a fever  · You have increased pain or swelling at the surgical site  · Your wound is red, swollen, tender, or has pus coming from it  · You have questions or concerns about your condition or care  Seek care immediately or call 911 if:   Your stitches or staples become loose or fall out  © 2014 [employer] is for End User's use only and may not be sold, redistributed or otherwise used for commercial purposes  All illustrations and images included in CareNotes® are the copyrighted property of A [employer] M [employer] Inc  or Claude Dailey  The above information is an  only  It is not intended as medical advice for individual conditions or treatments  Talk to your doctor, nurse or pharmacist before following any medical regimen to see if it is safe and effective for you  Call Dr Simone Ernandez with any questions or concerns: office ; mobile  (urgent issues)

## 2020-03-04 NOTE — OP NOTE
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]    Procedure(s) (LRB):  RIGHT PARTIAL GLOSSECTOMY W LASER (Right)    Specimen(s):  ID Type Source Tests Collected by Time Destination   1 : Right lateral tongue - Ventral  Tissue Tongue TISSUE EXAM Alia Post MD 3/4/2020 1117    2 : Right lateral tongue -posterior  Tissue Tongue TISSUE EXAM Alia Post MD 3/4/2020 1120    3 : Right lateral tongue -anterior  Tissue Tongue TISSUE EXAM Alia Post MD 3/4/2020 1122    4 : Right posterior lateral tongue Tissue Tongue TISSUE EXAM Alia Post MD 3/4/2020 1130    5 : Right lateral tongue dorsom Tissue Tongue TISSUE EXAM Alia Post MD 3/4/2020 1133        Estimated Blood Loss:   Minimal    Drains:  * No LDAs found *    Anesthesia Type:   General    Operative Indications:  Leukoplakia of oral mucosa, 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]      Operative Findings:  Frozen sections notable for anterior clear, posterior mild-to-moderate dysplasia, ventral mild dysplasia  Grossly there is an area of erythroplakia right posterior lateral tongue as well as leukoplakia along the dorsal aspect of the tongue    Complications:   None    Procedure and Technique:  The patient is to the operating room  She was placed supine the operating table and general anesthesia was induced  She was intubated with a laser safe tube  Table then turned 90°  She was then prepped and draped in the usual fashion with wet towels  A time-out was performed the patient's identity and procedure were then confirmed  A single silk sutures placed anterior tongue for retraction  A side mouth prop was placed on the left-hand side    The tongue was then exposed  This was visually assessed and there is visible thick leukoplakia along the right lateral dorsum of the tongue  There was a small area of erythroplakia along the posterior lateral right tongue of approximately 1 x 1 cm  Frozen sections were obtained from the right ventral aspect of the tongue at the junction of the floor of the mouth  Additional frozen section was taken from the erythroleukoplakic lesion in the anterior tongue  Local anesthesia in the form of lidocaine 1% with 1 100,000 epinephrine was then placed into the right lateral tongue  A 15 blade was then used to excise the most dense area of the dorsal leukoplakia  The 15 was then used to excise the remaining aspect of the urethra leukoplakia  The CO2 laser at a setting of 12 w was then used to ablate the mucosa along the margins of the leukoplakia nearly to the midline of the tongue, posteriorly to the glossotonsillar fold and inferiorly to the floor of the mouth  Several passes were made with the laser to ablate the superficial aspect of the mucosa  The frozen sections were resulted as areas of mild to moderate dysplasia posteriorly and mild dysplasia at the ventral tongue  Additional ablation was then performed around these lesions  The mucosa at the border of the ablations was grossly normal   Hemostasis was achieved  The patient was then turned back to anesthesia where she was woken and extubated  She tolerated the procedure well without complication     I was present for the entire procedure    Patient Disposition:  PACU     SIGNATURE: Trini Gonzalez MD  DATE: March 4, 2020  TIME: 12:11 PM

## 2020-05-14 ENCOUNTER — APPOINTMENT (OUTPATIENT)
Dept: RADIOLOGY | Facility: CLINIC | Age: 35
End: 2020-05-14
Payer: COMMERCIAL

## 2020-05-14 ENCOUNTER — OFFICE VISIT (OUTPATIENT)
Dept: PODIATRY | Facility: CLINIC | Age: 35
End: 2020-05-14
Payer: COMMERCIAL

## 2020-05-14 VITALS — WEIGHT: 145 LBS | BODY MASS INDEX: 26.68 KG/M2 | HEIGHT: 62 IN

## 2020-05-14 DIAGNOSIS — Z01.818 PREOP TESTING: Primary | ICD-10-CM

## 2020-05-14 DIAGNOSIS — M20.11 HALLUX VALGUS, RIGHT: Primary | ICD-10-CM

## 2020-05-14 DIAGNOSIS — M35.7 HYPERMOBILE JOINT SYNDROME OF FOOT: ICD-10-CM

## 2020-05-14 DIAGNOSIS — M20.11 HALLUX VALGUS, RIGHT: ICD-10-CM

## 2020-05-14 DIAGNOSIS — M25.571 PAIN IN JOINT OF RIGHT FOOT: ICD-10-CM

## 2020-05-14 PROCEDURE — 3008F BODY MASS INDEX DOCD: CPT | Performed by: PODIATRIST

## 2020-05-14 PROCEDURE — 99203 OFFICE O/P NEW LOW 30 MIN: CPT | Performed by: PODIATRIST

## 2020-05-14 PROCEDURE — 73630 X-RAY EXAM OF FOOT: CPT

## 2020-05-14 PROCEDURE — 1036F TOBACCO NON-USER: CPT | Performed by: PODIATRIST

## 2020-05-20 LAB
BASOPHILS # BLD AUTO: 0 X10E3/UL (ref 0–0.2)
BASOPHILS NFR BLD AUTO: 0 %
BUN SERPL-MCNC: 14 MG/DL (ref 6–20)
BUN/CREAT SERPL: 16 (ref 9–23)
CALCIUM SERPL-MCNC: 9.8 MG/DL (ref 8.7–10.2)
CHLORIDE SERPL-SCNC: 102 MMOL/L (ref 96–106)
CO2 SERPL-SCNC: 23 MMOL/L (ref 20–29)
CREAT SERPL-MCNC: 0.88 MG/DL (ref 0.57–1)
EOSINOPHIL # BLD AUTO: 0.1 X10E3/UL (ref 0–0.4)
EOSINOPHIL NFR BLD AUTO: 2 %
ERYTHROCYTE [DISTWIDTH] IN BLOOD BY AUTOMATED COUNT: 12.3 % (ref 11.7–15.4)
GLUCOSE SERPL-MCNC: 97 MG/DL (ref 65–99)
HCT VFR BLD AUTO: 42.7 % (ref 34–46.6)
HGB BLD-MCNC: 14.2 G/DL (ref 11.1–15.9)
IMM GRANULOCYTES # BLD: 0 X10E3/UL (ref 0–0.1)
IMM GRANULOCYTES NFR BLD: 0 %
LYMPHOCYTES # BLD AUTO: 2.6 X10E3/UL (ref 0.7–3.1)
LYMPHOCYTES NFR BLD AUTO: 44 %
MCH RBC QN AUTO: 28.7 PG (ref 26.6–33)
MCHC RBC AUTO-ENTMCNC: 33.3 G/DL (ref 31.5–35.7)
MCV RBC AUTO: 86 FL (ref 79–97)
MONOCYTES # BLD AUTO: 0.3 X10E3/UL (ref 0.1–0.9)
MONOCYTES NFR BLD AUTO: 6 %
NEUTROPHILS # BLD AUTO: 2.8 X10E3/UL (ref 1.4–7)
NEUTROPHILS NFR BLD AUTO: 48 %
PLATELET # BLD AUTO: 344 X10E3/UL (ref 150–450)
POTASSIUM SERPL-SCNC: 4.5 MMOL/L (ref 3.5–5.2)
RBC # BLD AUTO: 4.94 X10E6/UL (ref 3.77–5.28)
SL AMB EGFR AFRICAN AMERICAN: 99 ML/MIN/1.73
SL AMB EGFR NON AFRICAN AMERICAN: 86 ML/MIN/1.73
SODIUM SERPL-SCNC: 138 MMOL/L (ref 134–144)
WBC # BLD AUTO: 5.9 X10E3/UL (ref 3.4–10.8)

## 2020-05-27 ENCOUNTER — OFFICE VISIT (OUTPATIENT)
Dept: FAMILY MEDICINE CLINIC | Facility: HOSPITAL | Age: 35
End: 2020-05-27
Payer: COMMERCIAL

## 2020-05-27 VITALS
WEIGHT: 141.2 LBS | HEIGHT: 62 IN | OXYGEN SATURATION: 98 % | HEART RATE: 91 BPM | DIASTOLIC BLOOD PRESSURE: 68 MMHG | TEMPERATURE: 98.7 F | SYSTOLIC BLOOD PRESSURE: 110 MMHG | BODY MASS INDEX: 25.98 KG/M2

## 2020-05-27 DIAGNOSIS — M21.611 BUNION, RIGHT FOOT: ICD-10-CM

## 2020-05-27 DIAGNOSIS — G43.009 MIGRAINE WITHOUT AURA AND WITHOUT STATUS MIGRAINOSUS, NOT INTRACTABLE: ICD-10-CM

## 2020-05-27 DIAGNOSIS — Z01.818 PRE-OP EXAMINATION: Primary | ICD-10-CM

## 2020-05-27 PROBLEM — K13.21 LEUKOPLAKIA OF ORAL MUCOSA, INCLUDING TONGUE: Status: RESOLVED | Noted: 2020-02-04 | Resolved: 2020-05-27

## 2020-05-27 PROBLEM — M21.612 BUNION, LEFT FOOT: Status: RESOLVED | Noted: 2020-05-27 | Resolved: 2020-05-27

## 2020-05-27 PROBLEM — M21.612 BUNION, LEFT FOOT: Status: ACTIVE | Noted: 2020-05-27

## 2020-05-27 PROCEDURE — 1036F TOBACCO NON-USER: CPT | Performed by: INTERNAL MEDICINE

## 2020-05-27 PROCEDURE — 3008F BODY MASS INDEX DOCD: CPT | Performed by: INTERNAL MEDICINE

## 2020-05-27 PROCEDURE — 99242 OFF/OP CONSLTJ NEW/EST SF 20: CPT | Performed by: INTERNAL MEDICINE

## 2020-05-27 RX ORDER — BUTALBITAL, ACETAMINOPHEN AND CAFFEINE 50; 325; 40 MG/1; MG/1; MG/1
1 TABLET ORAL EVERY 4 HOURS PRN
Qty: 45 TABLET | Refills: 1 | Status: SHIPPED | OUTPATIENT
Start: 2020-05-27 | End: 2020-06-26

## 2020-05-28 ENCOUNTER — PREP FOR PROCEDURE (OUTPATIENT)
Dept: PODIATRY | Facility: CLINIC | Age: 35
End: 2020-05-28

## 2020-05-28 DIAGNOSIS — M20.11 ACQUIRED HALLUX VALGUS OF RIGHT FOOT: ICD-10-CM

## 2020-05-28 DIAGNOSIS — M35.7 HYPERMOBILE JOINT SYNDROME OF RIGHT FOOT: Primary | ICD-10-CM

## 2020-05-28 DIAGNOSIS — Z01.818 PRE-OP TESTING: ICD-10-CM

## 2020-05-28 RX ORDER — CHLORHEXIDINE GLUCONATE 4 G/100ML
SOLUTION TOPICAL DAILY PRN
Status: CANCELLED | OUTPATIENT
Start: 2020-06-12

## 2020-05-28 RX ORDER — CLINDAMYCIN PHOSPHATE 900 MG/50ML
900 INJECTION INTRAVENOUS ONCE
Status: CANCELLED | OUTPATIENT
Start: 2020-06-12

## 2020-05-28 RX ORDER — CHLORHEXIDINE GLUCONATE 0.12 MG/ML
15 RINSE ORAL ONCE
Status: CANCELLED | OUTPATIENT
Start: 2020-06-12

## 2020-06-08 DIAGNOSIS — Z01.818 PREOP TESTING: ICD-10-CM

## 2020-06-08 PROCEDURE — U0003 INFECTIOUS AGENT DETECTION BY NUCLEIC ACID (DNA OR RNA); SEVERE ACUTE RESPIRATORY SYNDROME CORONAVIRUS 2 (SARS-COV-2) (CORONAVIRUS DISEASE [COVID-19]), AMPLIFIED PROBE TECHNIQUE, MAKING USE OF HIGH THROUGHPUT TECHNOLOGIES AS DESCRIBED BY CMS-2020-01-R: HCPCS

## 2020-06-10 LAB — SARS-COV-2 RNA SPEC QL NAA+PROBE: NOT DETECTED

## 2020-06-11 ENCOUNTER — ANESTHESIA EVENT (OUTPATIENT)
Dept: PERIOP | Facility: HOSPITAL | Age: 35
End: 2020-06-11
Payer: COMMERCIAL

## 2020-06-12 ENCOUNTER — ANESTHESIA (OUTPATIENT)
Dept: PERIOP | Facility: HOSPITAL | Age: 35
End: 2020-06-12
Payer: COMMERCIAL

## 2020-06-12 ENCOUNTER — HOSPITAL ENCOUNTER (OUTPATIENT)
Facility: HOSPITAL | Age: 35
Setting detail: OUTPATIENT SURGERY
Discharge: HOME | End: 2020-06-12
Attending: PODIATRIST | Admitting: PODIATRIST
Payer: COMMERCIAL

## 2020-06-12 ENCOUNTER — HOSPITAL ENCOUNTER (OUTPATIENT)
Dept: RADIOLOGY | Facility: HOSPITAL | Age: 35
Setting detail: OUTPATIENT SURGERY
Discharge: HOME | End: 2020-06-12
Payer: COMMERCIAL

## 2020-06-12 ENCOUNTER — APPOINTMENT (OUTPATIENT)
Dept: RADIOLOGY | Facility: HOSPITAL | Age: 35
End: 2020-06-12
Payer: COMMERCIAL

## 2020-06-12 VITALS
HEIGHT: 62 IN | DIASTOLIC BLOOD PRESSURE: 73 MMHG | BODY MASS INDEX: 25.95 KG/M2 | SYSTOLIC BLOOD PRESSURE: 134 MMHG | HEART RATE: 95 BPM | WEIGHT: 141 LBS | TEMPERATURE: 98.1 F | OXYGEN SATURATION: 97 % | RESPIRATION RATE: 16 BRPM

## 2020-06-12 DIAGNOSIS — Z98.890 POSTOPERATIVE STATE: Primary | ICD-10-CM

## 2020-06-12 DIAGNOSIS — M35.7 HYPERMOBILE JOINT SYNDROME OF RIGHT FOOT: ICD-10-CM

## 2020-06-12 LAB
EXT PREGNANCY TEST URINE: NEGATIVE
EXT. CONTROL: NORMAL

## 2020-06-12 PROCEDURE — 73630 X-RAY EXAM OF FOOT: CPT

## 2020-06-12 PROCEDURE — 99024 POSTOP FOLLOW-UP VISIT: CPT | Performed by: PODIATRIST

## 2020-06-12 PROCEDURE — 28297 COR HLX VLGS JT ARTHRD: CPT | Performed by: PODIATRIST

## 2020-06-12 PROCEDURE — 73620 X-RAY EXAM OF FOOT: CPT

## 2020-06-12 PROCEDURE — 20974 ESTIM AID BONE HEALG N-INVAS: CPT | Performed by: PODIATRIST

## 2020-06-12 PROCEDURE — NC001 PR NO CHARGE: Performed by: PODIATRIST

## 2020-06-12 PROCEDURE — 81025 URINE PREGNANCY TEST: CPT | Performed by: ANESTHESIOLOGY

## 2020-06-12 PROCEDURE — C1713 ANCHOR/SCREW BN/BN,TIS/BN: HCPCS | Performed by: PODIATRIST

## 2020-06-12 PROCEDURE — C9290 INJ, BUPIVACAINE LIPOSOME: HCPCS | Performed by: STUDENT IN AN ORGANIZED HEALTH CARE EDUCATION/TRAINING PROGRAM

## 2020-06-12 DEVICE — ANATOMIC BIPLANAR FIXATION
Type: IMPLANTABLE DEVICE | Site: FOOT | Status: FUNCTIONAL
Brand: LAPIPLASTY SYSTEM 1

## 2020-06-12 RX ORDER — HYDROCODONE BITARTRATE AND ACETAMINOPHEN 5; 325 MG/1; MG/1
1 TABLET ORAL EVERY 6 HOURS PRN
Qty: 30 TABLET | Refills: 0 | Status: SHIPPED | OUTPATIENT
Start: 2020-06-12 | End: 2020-06-22

## 2020-06-12 RX ORDER — ONDANSETRON 2 MG/ML
INJECTION INTRAMUSCULAR; INTRAVENOUS AS NEEDED
Status: DISCONTINUED | OUTPATIENT
Start: 2020-06-12 | End: 2020-06-12 | Stop reason: SURG

## 2020-06-12 RX ORDER — CHLORHEXIDINE GLUCONATE 4 G/100ML
SOLUTION TOPICAL DAILY PRN
Status: DISCONTINUED | OUTPATIENT
Start: 2020-06-12 | End: 2020-06-12 | Stop reason: HOSPADM

## 2020-06-12 RX ORDER — OXYCODONE HYDROCHLORIDE AND ACETAMINOPHEN 5; 325 MG/1; MG/1
1 TABLET ORAL EVERY 4 HOURS PRN
Status: DISCONTINUED | OUTPATIENT
Start: 2020-06-12 | End: 2020-06-12 | Stop reason: HOSPADM

## 2020-06-12 RX ORDER — CHLORHEXIDINE GLUCONATE 0.12 MG/ML
15 RINSE ORAL ONCE
Status: COMPLETED | OUTPATIENT
Start: 2020-06-12 | End: 2020-06-12

## 2020-06-12 RX ORDER — CLINDAMYCIN PHOSPHATE 900 MG/50ML
900 INJECTION INTRAVENOUS ONCE
Status: COMPLETED | OUTPATIENT
Start: 2020-06-12 | End: 2020-06-12

## 2020-06-12 RX ORDER — ONDANSETRON 2 MG/ML
4 INJECTION INTRAMUSCULAR; INTRAVENOUS ONCE AS NEEDED
Status: COMPLETED | OUTPATIENT
Start: 2020-06-12 | End: 2020-06-12

## 2020-06-12 RX ORDER — FENTANYL CITRATE 50 UG/ML
INJECTION, SOLUTION INTRAMUSCULAR; INTRAVENOUS AS NEEDED
Status: DISCONTINUED | OUTPATIENT
Start: 2020-06-12 | End: 2020-06-12 | Stop reason: SURG

## 2020-06-12 RX ORDER — SODIUM CHLORIDE 9 MG/ML
125 INJECTION, SOLUTION INTRAVENOUS CONTINUOUS
Status: DISCONTINUED | OUTPATIENT
Start: 2020-06-12 | End: 2020-06-12 | Stop reason: HOSPADM

## 2020-06-12 RX ORDER — DEXAMETHASONE SODIUM PHOSPHATE 4 MG/ML
INJECTION, SOLUTION INTRA-ARTICULAR; INTRALESIONAL; INTRAMUSCULAR; INTRAVENOUS; SOFT TISSUE AS NEEDED
Status: DISCONTINUED | OUTPATIENT
Start: 2020-06-12 | End: 2020-06-12 | Stop reason: SURG

## 2020-06-12 RX ORDER — MIDAZOLAM HYDROCHLORIDE 2 MG/2ML
INJECTION, SOLUTION INTRAMUSCULAR; INTRAVENOUS AS NEEDED
Status: DISCONTINUED | OUTPATIENT
Start: 2020-06-12 | End: 2020-06-12 | Stop reason: SURG

## 2020-06-12 RX ORDER — FENTANYL CITRATE/PF 50 MCG/ML
25 SYRINGE (ML) INJECTION
Status: DISCONTINUED | OUTPATIENT
Start: 2020-06-12 | End: 2020-06-12 | Stop reason: HOSPADM

## 2020-06-12 RX ORDER — PROPOFOL 10 MG/ML
INJECTION, EMULSION INTRAVENOUS AS NEEDED
Status: DISCONTINUED | OUTPATIENT
Start: 2020-06-12 | End: 2020-06-12 | Stop reason: SURG

## 2020-06-12 RX ORDER — BUPIVACAINE HYDROCHLORIDE 5 MG/ML
INJECTION, SOLUTION PERINEURAL AS NEEDED
Status: DISCONTINUED | OUTPATIENT
Start: 2020-06-12 | End: 2020-06-12 | Stop reason: HOSPADM

## 2020-06-12 RX ORDER — KETOROLAC TROMETHAMINE 30 MG/ML
INJECTION, SOLUTION INTRAMUSCULAR; INTRAVENOUS AS NEEDED
Status: DISCONTINUED | OUTPATIENT
Start: 2020-06-12 | End: 2020-06-12 | Stop reason: SURG

## 2020-06-12 RX ORDER — MAGNESIUM HYDROXIDE 1200 MG/15ML
LIQUID ORAL AS NEEDED
Status: DISCONTINUED | OUTPATIENT
Start: 2020-06-12 | End: 2020-06-12 | Stop reason: HOSPADM

## 2020-06-12 RX ADMIN — DEXAMETHASONE SODIUM PHOSPHATE 4 MG: 4 INJECTION, SOLUTION INTRAMUSCULAR; INTRAVENOUS at 16:18

## 2020-06-12 RX ADMIN — PROPOFOL 200 MG: 10 INJECTION, EMULSION INTRAVENOUS at 15:00

## 2020-06-12 RX ADMIN — CHLORHEXIDINE GLUCONATE 0.12% ORAL RINSE 15 ML: 1.2 LIQUID ORAL at 12:16

## 2020-06-12 RX ADMIN — FENTANYL CITRATE 50 MCG: 50 INJECTION, SOLUTION INTRAMUSCULAR; INTRAVENOUS at 15:03

## 2020-06-12 RX ADMIN — ONDANSETRON 4 MG: 2 INJECTION INTRAMUSCULAR; INTRAVENOUS at 16:18

## 2020-06-12 RX ADMIN — CLINDAMYCIN PHOSPHATE 900 MG: 18 INJECTION, SOLUTION INTRAMUSCULAR; INTRAVENOUS at 15:05

## 2020-06-12 RX ADMIN — ONDANSETRON 4 MG: 2 INJECTION INTRAMUSCULAR; INTRAVENOUS at 17:56

## 2020-06-12 RX ADMIN — FENTANYL CITRATE 50 MCG: 50 INJECTION, SOLUTION INTRAMUSCULAR; INTRAVENOUS at 15:07

## 2020-06-12 RX ADMIN — FENTANYL CITRATE 100 MCG: 50 INJECTION, SOLUTION INTRAMUSCULAR; INTRAVENOUS at 14:56

## 2020-06-12 RX ADMIN — KETOROLAC TROMETHAMINE 30 MG: 30 INJECTION, SOLUTION INTRAMUSCULAR at 16:18

## 2020-06-12 RX ADMIN — SODIUM CHLORIDE 125 ML/HR: 0.9 INJECTION, SOLUTION INTRAVENOUS at 12:31

## 2020-06-12 RX ADMIN — MIDAZOLAM 4 MG: 1 INJECTION INTRAMUSCULAR; INTRAVENOUS at 14:56

## 2020-06-22 ENCOUNTER — OFFICE VISIT (OUTPATIENT)
Dept: PODIATRY | Facility: CLINIC | Age: 35
End: 2020-06-22

## 2020-06-22 VITALS — HEIGHT: 62 IN | WEIGHT: 141 LBS | BODY MASS INDEX: 25.95 KG/M2

## 2020-06-22 DIAGNOSIS — M35.7 HYPERMOBILE JOINT SYNDROME OF FOOT: Primary | ICD-10-CM

## 2020-06-22 DIAGNOSIS — M20.11 HALLUX VALGUS, RIGHT: ICD-10-CM

## 2020-06-22 PROCEDURE — 3008F BODY MASS INDEX DOCD: CPT | Performed by: PODIATRIST

## 2020-06-22 PROCEDURE — 99024 POSTOP FOLLOW-UP VISIT: CPT | Performed by: PODIATRIST

## 2020-06-29 ENCOUNTER — APPOINTMENT (OUTPATIENT)
Dept: RADIOLOGY | Facility: CLINIC | Age: 35
End: 2020-06-29
Payer: COMMERCIAL

## 2020-06-29 ENCOUNTER — OFFICE VISIT (OUTPATIENT)
Dept: PODIATRY | Facility: CLINIC | Age: 35
End: 2020-06-29

## 2020-06-29 DIAGNOSIS — M20.11 HALLUX VALGUS, RIGHT: ICD-10-CM

## 2020-06-29 DIAGNOSIS — M35.7 HYPERMOBILE JOINT SYNDROME OF FOOT: ICD-10-CM

## 2020-06-29 DIAGNOSIS — M20.11 HALLUX VALGUS, RIGHT: Primary | ICD-10-CM

## 2020-06-29 PROCEDURE — 73630 X-RAY EXAM OF FOOT: CPT

## 2020-06-29 PROCEDURE — 99024 POSTOP FOLLOW-UP VISIT: CPT | Performed by: PODIATRIST

## 2020-07-07 ENCOUNTER — TELEPHONE (OUTPATIENT)
Dept: PODIATRY | Facility: CLINIC | Age: 35
End: 2020-07-07

## 2020-07-07 NOTE — TELEPHONE ENCOUNTER
Patient called regarding instructions given to her from Dr Bryce Dacosta at last visit  She was instructed to start bearing weight on her heel  The patient wanted to know if she could discontinue the use of her crutches because she said she can balance better without them  Will advise with Dr Bryce Dacosta and get back to the patient

## 2020-07-07 NOTE — TELEPHONE ENCOUNTER
Returned patient's call and left message letting patient know that she may discontinue use of crutches but to make sure she still wears the CAM walker  Instructed patient to return my call if she had any questions

## 2020-07-20 ENCOUNTER — OFFICE VISIT (OUTPATIENT)
Dept: PODIATRY | Facility: CLINIC | Age: 35
End: 2020-07-20

## 2020-07-20 VITALS — TEMPERATURE: 98.1 F | BODY MASS INDEX: 25.79 KG/M2 | HEIGHT: 62 IN

## 2020-07-20 DIAGNOSIS — M35.7 HYPERMOBILE JOINT SYNDROME OF FOOT: ICD-10-CM

## 2020-07-20 DIAGNOSIS — M20.11 HALLUX VALGUS, RIGHT: Primary | ICD-10-CM

## 2020-07-20 PROCEDURE — 99024 POSTOP FOLLOW-UP VISIT: CPT | Performed by: PODIATRIST

## 2020-07-20 NOTE — PROGRESS NOTES
PATIENT:  Maxine Simons      1985    ASSESSMENT     1  Hallux valgus, right  XR foot 3+ vw right   2  Hypermobile joint syndrome of foot  XR foot 3+ vw right          PLAN  Patient is doing well post-operatively  Repeat X-ray in 2 weeks  Okay for full WB with CAM walker  Continue bone stim  Continue post-op care as instructed  Call if any increase in pain, fevers, calf pain, shortness of breath, or general distress is noted  Patient instructed to go to ER if call is not returned immediately  RA in 4 weeks  HISTORY OF PRESENT ILLNESS  Patient presents for post-op appointment  Post-op pain is minimal   Tolerates WB with CAM walker  The patient is feeling well and in good spirits  Patient reported no post-op concern  REVIEW OF SYSTEMS  Patient denied CP, SOB, fever, chills, palpatation, HA, GI problem, or calf pain  PHYSICAL EXAMINATION  GENERAL  The patient appears in NAD / non-toxic  Afebrile  VSS    VASCULAR EXAM  Pedal pulses and vascular status are intact  No calf pain or edema bilaterally  No cyanosis  DERMATOLOGIC EXAM  No signs of infection  No active drainage  Decreased post-op edema and ecchymosis  No necrosis or dehiscence  NEUROLOGIC EXAM  AAO X 3  No focal neurologic deficit  Neurologic status is intact BLE  MUSCULOSKELETAL EXAM  Good surgical correction  Normal post-op findings  ROM intact  No fluctuation or crepitus

## 2020-08-03 ENCOUNTER — APPOINTMENT (OUTPATIENT)
Dept: RADIOLOGY | Facility: CLINIC | Age: 35
End: 2020-08-03
Payer: COMMERCIAL

## 2020-08-03 DIAGNOSIS — M35.7 HYPERMOBILE JOINT SYNDROME OF FOOT: ICD-10-CM

## 2020-08-03 DIAGNOSIS — M20.11 HALLUX VALGUS, RIGHT: ICD-10-CM

## 2020-08-03 PROCEDURE — 73630 X-RAY EXAM OF FOOT: CPT

## 2020-08-04 ENCOUNTER — TELEPHONE (OUTPATIENT)
Dept: PODIATRY | Facility: CLINIC | Age: 35
End: 2020-08-04

## 2020-08-04 NOTE — TELEPHONE ENCOUNTER
Called patient and let her know that she may start wearing a sneaker and she can start driving   Patient was happy to hear

## 2020-08-04 NOTE — TELEPHONE ENCOUNTER
I called and let patient know that her xrays looked normal and the surgical site looked fine  She wanted to know if she could start driving and wear a sneaker

## 2020-08-04 NOTE — TELEPHONE ENCOUNTER
Patient called in and LM wanting Dr Sarah Tolbert to know that she completed her xrays on 08/03/2020  She wanted a call to know the results of her xrays

## 2020-08-17 ENCOUNTER — OFFICE VISIT (OUTPATIENT)
Dept: PODIATRY | Facility: CLINIC | Age: 35
End: 2020-08-17

## 2020-08-17 ENCOUNTER — TELEPHONE (OUTPATIENT)
Dept: PODIATRY | Facility: CLINIC | Age: 35
End: 2020-08-17

## 2020-08-17 VITALS — HEIGHT: 62 IN | BODY MASS INDEX: 25.76 KG/M2 | WEIGHT: 140 LBS | TEMPERATURE: 98.6 F

## 2020-08-17 DIAGNOSIS — M20.11 HALLUX VALGUS, RIGHT: Primary | ICD-10-CM

## 2020-08-17 PROCEDURE — 99024 POSTOP FOLLOW-UP VISIT: CPT | Performed by: PODIATRIST

## 2020-08-17 PROCEDURE — 3008F BODY MASS INDEX DOCD: CPT | Performed by: PODIATRIST

## 2020-08-17 PROCEDURE — 3008F BODY MASS INDEX DOCD: CPT | Performed by: INTERNAL MEDICINE

## 2020-08-17 NOTE — PROGRESS NOTES
PATIENT:  Alexandria Recio      1985    ASSESSMENT     1  Hallux valgus, right            PLAN  Patient is doing well post-operatively  Advance activity as tolerated  She has mildly decreased right hallux plantarflexion and instructed ROM exercise  RA in 1 month  HISTORY OF PRESENT ILLNESS  Patient presents for post-op appointment  She is doing great with minimal pain  Tolerates WB with sneakers  The patient is feeling well and in good spirits  Patient reported no post-op concern  REVIEW OF SYSTEMS  Patient denied CP, SOB, fever, chills, palpatation, HA, GI problem, or calf pain  PHYSICAL EXAMINATION  GENERAL  The patient appears in NAD / non-toxic  Afebrile  VSS    VASCULAR EXAM  Pedal pulses and vascular status are intact  No calf pain or edema bilaterally  No cyanosis  DERMATOLOGIC EXAM  No signs of infection  No wound  Post-op edema continues to resolve  No necrosis or dehiscence  NEUROLOGIC EXAM  AAO X 3  No focal neurologic deficit  Neurologic status is intact BLE  MUSCULOSKELETAL EXAM  Good surgical correction  Normal post-op findings  ROM intact  No fluctuation or crepitus

## 2020-09-08 ENCOUNTER — TELEPHONE (OUTPATIENT)
Dept: FAMILY MEDICINE CLINIC | Facility: HOSPITAL | Age: 35
End: 2020-09-08

## 2020-09-08 DIAGNOSIS — R21 RASH: Primary | ICD-10-CM

## 2020-09-08 RX ORDER — MOMETASONE FUROATE 1 MG/G
CREAM TOPICAL 2 TIMES DAILY
Qty: 15 G | Refills: 0 | Status: SHIPPED | OUTPATIENT
Start: 2020-09-08

## 2020-09-08 RX ORDER — MOMETASONE FUROATE 1 MG/G
CREAM TOPICAL 2 TIMES DAILY
Status: CANCELLED | OUTPATIENT
Start: 2020-09-08

## 2020-09-10 DIAGNOSIS — R21 RASH: Primary | ICD-10-CM

## 2020-09-10 PROBLEM — M21.611 BUNION, RIGHT FOOT: Status: RESOLVED | Noted: 2020-05-27 | Resolved: 2020-09-10

## 2020-09-10 PROBLEM — Z01.818 PRE-OP EXAMINATION: Status: RESOLVED | Noted: 2020-05-27 | Resolved: 2020-09-10

## 2020-09-10 RX ORDER — METHYLPREDNISOLONE 4 MG/1
TABLET ORAL
Qty: 21 TABLET | Refills: 0 | Status: SHIPPED | OUTPATIENT
Start: 2020-09-10 | End: 2020-11-09

## 2020-09-16 ENCOUNTER — OFFICE VISIT (OUTPATIENT)
Dept: PODIATRY | Facility: CLINIC | Age: 35
End: 2020-09-16

## 2020-09-16 VITALS — WEIGHT: 140 LBS | HEIGHT: 62 IN | TEMPERATURE: 99.5 F | BODY MASS INDEX: 25.76 KG/M2

## 2020-09-16 DIAGNOSIS — M20.11 HALLUX VALGUS, RIGHT: Primary | ICD-10-CM

## 2020-09-16 PROCEDURE — 99024 POSTOP FOLLOW-UP VISIT: CPT | Performed by: PODIATRIST

## 2020-09-17 ENCOUNTER — TELEPHONE (OUTPATIENT)
Dept: PODIATRY | Facility: CLINIC | Age: 35
End: 2020-09-17

## 2020-09-17 NOTE — PROGRESS NOTES
PATIENT:  Kimi Roach      1985    ASSESSMENT     1  Hallux valgus, right            PLAN  Patient recovered from surgery well  Advance activity as tolerated  Continue ROM exercise  She may return as needed  HISTORY OF PRESENT ILLNESS  Patient presents for post-op appointment  She is doing great with no pain  Tolerates walking and exercise  The patient is feeling well and in good spirits  Patient reported no post-op concern  REVIEW OF SYSTEMS  Patient denied CP, SOB, fever, chills, palpatation, HA, GI problem, or calf pain  PHYSICAL EXAMINATION  GENERAL  The patient appears in NAD / non-toxic  Afebrile  VSS    VASCULAR EXAM  Pedal pulses and vascular status are intact  No calf pain or edema bilaterally  No cyanosis  DERMATOLOGIC EXAM  No signs of infection  No wound  Post-op edema mostly resolved  No necrosis or dehiscence  NEUROLOGIC EXAM  AAO X 3  No focal neurologic deficit  Neurologic status is intact BLE  MUSCULOSKELETAL EXAM  Good surgical correction  Normal post-op findings  ROM intact  No fluctuation or crepitus

## 2020-11-09 ENCOUNTER — OFFICE VISIT (OUTPATIENT)
Dept: FAMILY MEDICINE CLINIC | Facility: HOSPITAL | Age: 35
End: 2020-11-09
Payer: COMMERCIAL

## 2020-11-09 VITALS
WEIGHT: 145 LBS | DIASTOLIC BLOOD PRESSURE: 64 MMHG | OXYGEN SATURATION: 99 % | HEIGHT: 62 IN | BODY MASS INDEX: 26.68 KG/M2 | TEMPERATURE: 97.9 F | HEART RATE: 75 BPM | SYSTOLIC BLOOD PRESSURE: 118 MMHG

## 2020-11-09 DIAGNOSIS — Z00.00 HEALTH CARE MAINTENANCE: Primary | ICD-10-CM

## 2020-11-09 DIAGNOSIS — Z13.29 SCREENING FOR THYROID DISORDER: ICD-10-CM

## 2020-11-09 DIAGNOSIS — Z13.0 SCREENING, ANEMIA, DEFICIENCY, IRON: ICD-10-CM

## 2020-11-09 DIAGNOSIS — Z13.220 SCREENING, LIPID: ICD-10-CM

## 2020-11-09 DIAGNOSIS — Z13.1 SCREENING FOR DIABETES MELLITUS: ICD-10-CM

## 2020-11-09 PROCEDURE — 3008F BODY MASS INDEX DOCD: CPT | Performed by: INTERNAL MEDICINE

## 2020-11-09 PROCEDURE — 99395 PREV VISIT EST AGE 18-39: CPT | Performed by: INTERNAL MEDICINE

## 2020-11-09 PROCEDURE — 1036F TOBACCO NON-USER: CPT | Performed by: INTERNAL MEDICINE

## 2020-11-09 PROCEDURE — 3725F SCREEN DEPRESSION PERFORMED: CPT | Performed by: INTERNAL MEDICINE

## 2020-11-13 LAB
CHOLEST SERPL-MCNC: 201 MG/DL (ref 100–199)
HDLC SERPL-MCNC: 77 MG/DL
LDLC SERPL CALC-MCNC: 116 MG/DL (ref 0–99)
LDLC/HDLC SERPL: 1.5 RATIO (ref 0–3.2)
SL AMB VLDL CHOLESTEROL CALC: 8 MG/DL (ref 5–40)
TRIGL SERPL-MCNC: 41 MG/DL (ref 0–149)
TSH SERPL DL<=0.005 MIU/L-ACNC: 1.61 UIU/ML (ref 0.45–4.5)

## 2021-01-14 DIAGNOSIS — G43.009 MIGRAINE WITHOUT AURA AND WITHOUT STATUS MIGRAINOSUS, NOT INTRACTABLE: Primary | ICD-10-CM

## 2021-01-18 RX ORDER — BUTALBITAL, ACETAMINOPHEN AND CAFFEINE 50; 325; 40 MG/1; MG/1; MG/1
TABLET ORAL
Qty: 30 TABLET | Refills: 1 | Status: SHIPPED | OUTPATIENT
Start: 2021-01-18 | End: 2021-11-17

## 2021-03-04 ENCOUNTER — OFFICE VISIT (OUTPATIENT)
Dept: FAMILY MEDICINE CLINIC | Facility: HOSPITAL | Age: 36
End: 2021-03-04
Payer: COMMERCIAL

## 2021-03-04 VITALS
SYSTOLIC BLOOD PRESSURE: 110 MMHG | HEIGHT: 62 IN | DIASTOLIC BLOOD PRESSURE: 80 MMHG | WEIGHT: 149 LBS | BODY MASS INDEX: 27.42 KG/M2 | HEART RATE: 64 BPM

## 2021-03-04 DIAGNOSIS — G43.009 MIGRAINE WITHOUT AURA AND WITHOUT STATUS MIGRAINOSUS, NOT INTRACTABLE: Primary | ICD-10-CM

## 2021-03-04 PROCEDURE — 3008F BODY MASS INDEX DOCD: CPT | Performed by: INTERNAL MEDICINE

## 2021-03-04 PROCEDURE — 1036F TOBACCO NON-USER: CPT | Performed by: INTERNAL MEDICINE

## 2021-03-04 PROCEDURE — 99213 OFFICE O/P EST LOW 20 MIN: CPT | Performed by: INTERNAL MEDICINE

## 2021-03-04 RX ORDER — MULTIVIT-MIN/IRON/FOLIC ACID/K 18-600-40
1 CAPSULE ORAL DAILY
COMMUNITY

## 2021-03-04 RX ORDER — SUMATRIPTAN 50 MG/1
50 TABLET, FILM COATED ORAL ONCE AS NEEDED
Qty: 9 TABLET | Refills: 0 | Status: SHIPPED | OUTPATIENT
Start: 2021-03-04 | End: 2021-03-29 | Stop reason: SDUPTHER

## 2021-03-05 NOTE — PROGRESS NOTES
Assessment/Plan:       Diagnoses and all orders for this visit:    Migraine without aura and without status migrainosus, not intractable  Comments:  Retinal  Orders:  -     SUMAtriptan (IMITREX) 50 mg tablet; Take 1 tablet (50 mg total) by mouth once as needed for migraine for up to 1 dose May repeat every 90 min up to 3 doses in 24 hour period  Other orders  -     Ascorbic Acid (Vitamin C) 500 MG CAPS; Take 1 capsule by mouth daily  -     BIOTIN PO; Take 1 tablet by mouth daily          All of the above diagnoses have been assessed  Additional COMMENTS/PLAN: counseled about taking off from computer screen tomorrow  Also dietary restrictions  Subjective:      Patient ID: Karla Cole is a 28 y o  female  HPI     Pain behind R eye yesterday  This has been coming and going  Working at [employer] makes it worse  Has had migraines in the past and has had some temporal HA this evening  No nausea  No blurred vision or field cut  The following portions of the patient's history were revised and updated as appropriate: Problem list, allergies, med list, FH, SH, Past medical and surgical histories  Current Outpatient Medications   Medication Sig Dispense Refill    Ascorbic Acid (Vitamin C) 500 MG CAPS Take 1 capsule by mouth daily      BIOTIN PO Take 1 tablet by mouth daily      butalbital-acetaminophen-caffeine (FIORICET,ESGIC) -40 mg per tablet TAKE 1 TABLET BY MOUTH EVERY 4 HOURS AS NEEDED FOR HEADACHES 30 tablet 1    Multiple Vitamins-Calcium (ONE-A-DAY WOMENS FORMULA PO) Take by mouth daily       mometasone (ELOCON) 0 1 % cream Apply topically 2 (two) times a day 15 g 0    SUMAtriptan (IMITREX) 50 mg tablet Take 1 tablet (50 mg total) by mouth once as needed for migraine for up to 1 dose May repeat every 90 min up to 3 doses in 24 hour period  9 tablet 0     No current facility-administered medications for this visit          Review of Systems   All other systems reviewed and are negative  Objective:    /80 (BP Location: Left arm, Patient Position: Sitting, Cuff Size: Standard)   Pulse 64   Ht 5' 2" (1 575 m)   Wt 67 6 kg (149 lb)   BMI 27 25 kg/m²     BP Readings from Last 3 Encounters:   03/04/21 110/80   11/09/20 118/64   06/12/20 134/73                  Wt Readings from Last 3 Encounters:   03/04/21 67 6 kg (149 lb)   11/09/20 65 8 kg (145 lb)   09/16/20 63 5 kg (140 lb)         Physical Exam  Vitals signs reviewed  HENT:      Head: Normocephalic and atraumatic  Eyes:      Extraocular Movements: Extraocular movements intact  Conjunctiva/sclera: Conjunctivae normal       Pupils: Pupils are equal, round, and reactive to light  Comments: There is no tenderness the orbits normal tension bilaterally  Neurological:      Mental Status: She is alert  No visits with results within 2 Week(s) from this visit  Latest known visit with results is:   Office Visit on 11/09/2020   Component Date Value Ref Range Status    Cholesterol, Total 11/12/2020 201* 100 - 199 mg/dL Final    Triglycerides 11/12/2020 41  0 - 149 mg/dL Final    HDL 11/12/2020 77  >39 mg/dL Final    VLDL Cholesterol Calculated 11/12/2020 8  5 - 40 mg/dL Final    LDL Calculated 11/12/2020 116* 0 - 99 mg/dL Final    LDl/HDL Ratio 11/12/2020 1 5  0 0 - 3 2 ratio Final    Comment:                                     LDL/HDL Ratio                                              Men  Women                                1/2 Avg  Risk  1 0    1 5                                    Avg Risk  3 6    3 2                                 2X Avg  Risk  6 2    5 0                                 3X Avg  Risk  8 0    6 1      TSH 11/12/2020 1 610  0 450 - 4 500 uIU/mL Final         Ute Tariq MD    Some or all of this note was generated with a voice recognition dictation system and therefore my contain grammatical or spelling errors

## 2021-03-25 PROBLEM — K13.21 ORAL LEUKOPLAKIA: Chronic | Status: ACTIVE | Noted: 2021-03-25

## 2021-03-29 DIAGNOSIS — G43.009 MIGRAINE WITHOUT AURA AND WITHOUT STATUS MIGRAINOSUS, NOT INTRACTABLE: ICD-10-CM

## 2021-03-29 RX ORDER — SUMATRIPTAN 50 MG/1
50 TABLET, FILM COATED ORAL ONCE AS NEEDED
Qty: 9 TABLET | Refills: 1 | Status: SHIPPED | OUTPATIENT
Start: 2021-03-29

## 2021-09-20 ENCOUNTER — TELEMEDICINE (OUTPATIENT)
Dept: FAMILY MEDICINE CLINIC | Facility: HOSPITAL | Age: 36
End: 2021-09-20
Payer: COMMERCIAL

## 2021-09-20 ENCOUNTER — TELEPHONE (OUTPATIENT)
Dept: FAMILY MEDICINE CLINIC | Facility: HOSPITAL | Age: 36
End: 2021-09-20

## 2021-09-20 VITALS — HEIGHT: 62 IN | BODY MASS INDEX: 26.68 KG/M2 | WEIGHT: 145 LBS

## 2021-09-20 DIAGNOSIS — U07.1 COVID-19: Primary | ICD-10-CM

## 2021-09-20 PROCEDURE — 3008F BODY MASS INDEX DOCD: CPT | Performed by: INTERNAL MEDICINE

## 2021-09-20 PROCEDURE — 99213 OFFICE O/P EST LOW 20 MIN: CPT | Performed by: INTERNAL MEDICINE

## 2021-09-20 PROCEDURE — 1036F TOBACCO NON-USER: CPT | Performed by: INTERNAL MEDICINE

## 2021-09-20 NOTE — PROGRESS NOTES
COVID-19 Outpatient Progress Note    Assessment/Plan:    Problem List Items Addressed This Visit     None      Visit Diagnoses     COVID-19    -  Primary         Disposition:     I recommended self-quarantine for 10 days and to watch for symptoms until 14 days after exposure  If patient were to develop symptoms, they should self isolate and call our office for further guidance  I have spent 15 minutes directly with the patient  Greater than 50% of this time was spent in counseling/coordination of care regarding: instructions for management and patient and family education  Verification of patient location:    Patient is located in the following state in which I hold an active license [place]    Encounter provider Dalila Diaz MD    Provider located at [address] [place] [id number]    Recent Visits  No visits were found meeting these conditions  Showing recent visits within past 7 days and meeting all other requirements  Today's Visits  Date Type Provider Dept   09/20/21 Irina Mercado MD [facility] [address]   Showing today's visits and meeting all other requirements  Future Appointments  No visits were found meeting these conditions  Showing future appointments within next 150 days and meeting all other requirements     This virtual check-in was done via [employer] and patient was informed that this is a secure, HIPAA-compliant platform  She agrees to proceed  Patient agrees to participate in a virtual check in via telephone or video visit instead of presenting to the office to address urgent/immediate medical needs  Patient is aware this is a billable service  After connecting through [facility], the patient was identified by name and date of birth   Alexus Hannah was informed that this was a telemedicine visit and that the exam was being conducted confidentially over secure lines  My office door was closed  No one else was in the room  Sahil Clemente acknowledged consent and understanding of privacy and security of the telemedicine visit  I informed the patient that I have reviewed her record in Epic and presented the opportunity for her to ask any questions regarding the visit today  The patient agreed to participate  Subjective:   Sahil Clemente is a 28 y o  female who is concerned about COVID-19  Patient's symptoms include fever (resolved on 9/17), fatigue, nasal congestion, anosmia, loss of taste (developed 9/19), cough (dry) and headache  Patient denies shortness of breath, abdominal pain, nausea, vomiting and diarrhea       Date of symptom onset: 9/13/2021  COVID-19 vaccination status: Not vaccinated    Exposure:   Contact with a person who is under investigation (PUI) for or who is positive for COVID-19 within the last 14 days?: Yes    Hospitalized recently for fever and/or lower respiratory symptoms?: No      Currently a healthcare worker that is involved in direct patient care?: No      Works in a special setting where the risk of COVID-19 transmission may be high? (this may include long-term care, correctional and group home facilities; homeless shelters; assisted-living facilities and group homes ): No      Resident in a special setting where the risk of COVID-19 transmission may be high? (this may include long-term care, correctional and group home facilities; homeless shelters; assisted-living facilities and group homes ): No      Lab Results   Component Value Date    Eduar Belling Not Detected 06/08/2020     Past Medical History:   Diagnosis Date    Acquired hallux valgus of right foot     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    Migraines     Wears glasses      Past Surgical History:   Procedure Laterality Date    FOOT OSTEOTOMY Left 2/17/2017    Procedure: OSTECTOMY 1ST METATARSAL;  Surgeon: Foster Araiza DPM;  Location: [facility];  Service:    [place] FOOT SURGERY      IL CORRJ HALLUX VALGUS W/SESMDC W/RESCJ PROX PHAL Right 6/12/2020    Procedure: Mickeal Meeter;  Surgeon: Cristy Diamond DPM;  [facility];  Service: Podiatry    IL ELECT BONE STIM NONINVASIVE Right 6/12/2020    Procedure: INSERTION BONE STIMULATOR;  Surgeon: Cristy Diamond DPM;  [facility];  Service: Podiatry    IL FUSION FOOT BONE,MIDTARSAL,1 JT Right 6/12/2020    Procedure: ARTHRODESIS / [facility];  Surgeon: Cristy Diamond DPM;  [facility];  Service: Podiatry    IL PART REMOVAL TONGUE,<1/2 Right 3/4/2020    Procedure: RIGHT PARTIAL GLOSSECTOMY W LASER;  Surgeon: Minh Song MD;  [facility];  Service: ENT    TOE OSTEOTOMY Left 2/17/2017    Procedure: CLOSING BASE WEDGE OSTEOTOMY 1ST METATARSAL: APPLICATION OF SHORT LEG SPLINT;APPLICTION AND ACTIVATION OF BONE STIMULATOR;  Surgeon: Cristy Diamond DPM;  [facility];  Service:    [place] VAGINAL DELIVERY      X 1    WISDOM TOOTH EXTRACTION      X 4     Current Outpatient Medications   Medication Sig Dispense Refill    Ascorbic Acid (Vitamin C) 500 MG CAPS Take 1 capsule by mouth daily      BIOTIN PO Take 1 tablet by mouth daily      butalbital-acetaminophen-caffeine (FIORICET,ESGIC) -40 mg per tablet TAKE 1 TABLET BY MOUTH EVERY 4 HOURS AS NEEDED FOR HEADACHES 30 tablet 1    Multiple Vitamins-Calcium (ONE-A-DAY WOMENS FORMULA PO) Take by mouth daily       mometasone (ELOCON) 0 1 % cream Apply topically 2 (two) times a day (Patient not taking: Reported on 9/20/2021) 15 g 0    SUMAtriptan (IMITREX) 50 mg tablet Take 1 tablet (50 mg total) by mouth once as needed for migraine for up to 1 dose May repeat every 90 min up to 3 doses in 24 hour period  (Patient not taking: Reported on 9/20/2021) 9 tablet 1     No current facility-administered medications for this visit       Allergies   Allergen Reactions    Penicillins Hives       Review of Systems   Constitutional: Positive for fatigue and fever (resolved on 9/17)  HENT: Positive for congestion  Respiratory: Positive for cough (dry)  Negative for shortness of breath  Gastrointestinal: Negative for abdominal pain, diarrhea, nausea and vomiting  Neurological: Positive for headaches  Objective:    Vitals:    09/20/21 1538   Weight: 65 8 kg (145 lb)   Height: 5' 2" (1 575 m)       Physical Exam  Constitutional:       General: She is not in acute distress  Appearance: She is not ill-appearing or toxic-appearing  HENT:      Head: Normocephalic  Eyes:      Conjunctiva/sclera: Conjunctivae normal    Pulmonary:      Effort: Pulmonary effort is normal  No respiratory distress  Neurological:      Mental Status: She is alert and oriented to person, place, and time  Cranial Nerves: No cranial nerve deficit  Psychiatric:         Mood and Affect: Mood normal          VIRTUAL VISIT DISCLAIMER    Nneka Montez verbally agrees to participate in [employer]  Pt is aware that [employer] could be limited without vital signs or the ability to perform a full hands-on physical Sandra Montpelier understands she or the provider may request at any time to terminate the video visit and request the patient to seek care or treatment in person

## 2021-09-20 NOTE — TELEPHONE ENCOUNTER
Had positive Covid test [facility], tested Wednesday 9/15, Started with symptoms 9/13, Congestion, runny nose, cough, lost of taste and smell, did have fever of 103, hasn't had fever since Friday 9/17    Asking for a nurse call back with advice on treatement, quarentine, etc

## 2021-09-23 ENCOUNTER — TELEPHONE (OUTPATIENT)
Dept: FAMILY MEDICINE CLINIC | Facility: HOSPITAL | Age: 36
End: 2021-09-23

## 2021-09-23 NOTE — TELEPHONE ENCOUNTER
Her ear complaints are likely caused by inflammation in her nose and sinuses from covid 19  I recommend she try flonase nasal spray one spray bid each nostril  This is OTC    She could also try taking advil/motrin/ibuprofen 400mg tid prn

## 2021-09-23 NOTE — TELEPHONE ENCOUNTER
Patient was advised to let dr Keila Olmos know if she started with any new symptoms after her virtual visit on Monday  Yesterday started w/ ear pain, now w/ bilateral ear clog/pressure  Took Mucinex last night and this morning - did not help  Asking if there is anything she can take to help with the ears

## 2021-09-27 NOTE — TELEPHONE ENCOUNTER
She can add Claritin 10 mg daily or Allegra 180 mg daily to Flonase nasal spray and Advil    She would need to be seen in office after the 10 day isolation is over to see what else could be going on

## 2021-11-17 DIAGNOSIS — G43.009 MIGRAINE WITHOUT AURA AND WITHOUT STATUS MIGRAINOSUS, NOT INTRACTABLE: ICD-10-CM

## 2021-11-17 RX ORDER — BUTALBITAL, ACETAMINOPHEN AND CAFFEINE 50; 325; 40 MG/1; MG/1; MG/1
TABLET ORAL
Qty: 30 TABLET | Refills: 1 | Status: SHIPPED | OUTPATIENT
Start: 2021-11-17 | End: 2022-06-01

## 2021-12-01 ENCOUNTER — OFFICE VISIT (OUTPATIENT)
Dept: FAMILY MEDICINE CLINIC | Facility: HOSPITAL | Age: 36
End: 2021-12-01
Payer: COMMERCIAL

## 2021-12-01 VITALS
DIASTOLIC BLOOD PRESSURE: 82 MMHG | HEIGHT: 62 IN | HEART RATE: 68 BPM | WEIGHT: 141 LBS | BODY MASS INDEX: 25.95 KG/M2 | OXYGEN SATURATION: 99 % | SYSTOLIC BLOOD PRESSURE: 120 MMHG | RESPIRATION RATE: 16 BRPM

## 2021-12-01 DIAGNOSIS — G43.009 MIGRAINE WITHOUT AURA AND WITHOUT STATUS MIGRAINOSUS, NOT INTRACTABLE: ICD-10-CM

## 2021-12-01 DIAGNOSIS — Z13.0 SCREENING, ANEMIA, DEFICIENCY, IRON: ICD-10-CM

## 2021-12-01 DIAGNOSIS — Z13.220 SCREENING, LIPID: ICD-10-CM

## 2021-12-01 DIAGNOSIS — Z00.00 HEALTH CARE MAINTENANCE: Primary | ICD-10-CM

## 2021-12-01 DIAGNOSIS — Z13.1 SCREENING FOR DIABETES MELLITUS: ICD-10-CM

## 2021-12-01 DIAGNOSIS — Z13.29 SCREENING FOR THYROID DISORDER: ICD-10-CM

## 2021-12-01 PROCEDURE — 1036F TOBACCO NON-USER: CPT | Performed by: INTERNAL MEDICINE

## 2021-12-01 PROCEDURE — 3725F SCREEN DEPRESSION PERFORMED: CPT | Performed by: INTERNAL MEDICINE

## 2021-12-01 PROCEDURE — 3008F BODY MASS INDEX DOCD: CPT | Performed by: INTERNAL MEDICINE

## 2021-12-01 PROCEDURE — 99395 PREV VISIT EST AGE 18-39: CPT | Performed by: INTERNAL MEDICINE

## 2021-12-10 LAB
ALBUMIN SERPL-MCNC: 5.1 G/DL (ref 3.8–4.8)
ALBUMIN/GLOB SERPL: 2.4 {RATIO} (ref 1.2–2.2)
ALP SERPL-CCNC: 48 IU/L (ref 44–121)
ALT SERPL-CCNC: 38 IU/L (ref 0–32)
AST SERPL-CCNC: 27 IU/L (ref 0–40)
BASOPHILS # BLD AUTO: 0 X10E3/UL (ref 0–0.2)
BASOPHILS NFR BLD AUTO: 0 %
BILIRUB SERPL-MCNC: 0.3 MG/DL (ref 0–1.2)
BUN SERPL-MCNC: 20 MG/DL (ref 6–20)
BUN/CREAT SERPL: 24 (ref 9–23)
CALCIUM SERPL-MCNC: 10 MG/DL (ref 8.7–10.2)
CHLORIDE SERPL-SCNC: 103 MMOL/L (ref 96–106)
CHOLEST SERPL-MCNC: 237 MG/DL (ref 100–199)
CO2 SERPL-SCNC: 24 MMOL/L (ref 20–29)
CREAT SERPL-MCNC: 0.84 MG/DL (ref 0.57–1)
EOSINOPHIL # BLD AUTO: 0 X10E3/UL (ref 0–0.4)
EOSINOPHIL NFR BLD AUTO: 1 %
ERYTHROCYTE [DISTWIDTH] IN BLOOD BY AUTOMATED COUNT: 12.3 % (ref 11.7–15.4)
GLOBULIN SER-MCNC: 2.1 G/DL (ref 1.5–4.5)
GLUCOSE SERPL-MCNC: 91 MG/DL (ref 65–99)
HCT VFR BLD AUTO: 40.5 % (ref 34–46.6)
HDLC SERPL-MCNC: 93 MG/DL
HGB BLD-MCNC: 13.6 G/DL (ref 11.1–15.9)
IMM GRANULOCYTES # BLD: 0 X10E3/UL (ref 0–0.1)
IMM GRANULOCYTES NFR BLD: 0 %
LDLC SERPL CALC-MCNC: 133 MG/DL (ref 0–99)
LDLC/HDLC SERPL: 1.4 RATIO (ref 0–3.2)
LYMPHOCYTES # BLD AUTO: 2.2 X10E3/UL (ref 0.7–3.1)
LYMPHOCYTES NFR BLD AUTO: 44 %
MCH RBC QN AUTO: 29.4 PG (ref 26.6–33)
MCHC RBC AUTO-ENTMCNC: 33.6 G/DL (ref 31.5–35.7)
MCV RBC AUTO: 88 FL (ref 79–97)
MONOCYTES # BLD AUTO: 0.3 X10E3/UL (ref 0.1–0.9)
MONOCYTES NFR BLD AUTO: 7 %
NEUTROPHILS # BLD AUTO: 2.4 X10E3/UL (ref 1.4–7)
NEUTROPHILS NFR BLD AUTO: 48 %
PLATELET # BLD AUTO: 333 X10E3/UL (ref 150–450)
POTASSIUM SERPL-SCNC: 4.5 MMOL/L (ref 3.5–5.2)
PROT SERPL-MCNC: 7.2 G/DL (ref 6–8.5)
RBC # BLD AUTO: 4.62 X10E6/UL (ref 3.77–5.28)
SL AMB EGFR AFRICAN AMERICAN: 103 ML/MIN/1.73
SL AMB EGFR NON AFRICAN AMERICAN: 90 ML/MIN/1.73
SL AMB VLDL CHOLESTEROL CALC: 11 MG/DL (ref 5–40)
SODIUM SERPL-SCNC: 141 MMOL/L (ref 134–144)
TRIGL SERPL-MCNC: 63 MG/DL (ref 0–149)
TSH SERPL DL<=0.005 MIU/L-ACNC: 1.47 UIU/ML (ref 0.45–4.5)
WBC # BLD AUTO: 4.9 X10E3/UL (ref 3.4–10.8)

## 2022-01-03 ENCOUNTER — TELEPHONE (OUTPATIENT)
Dept: FAMILY MEDICINE CLINIC | Facility: HOSPITAL | Age: 37
End: 2022-01-03

## 2022-06-01 DIAGNOSIS — G43.009 MIGRAINE WITHOUT AURA AND WITHOUT STATUS MIGRAINOSUS, NOT INTRACTABLE: ICD-10-CM

## 2022-06-01 RX ORDER — BUTALBITAL, ACETAMINOPHEN AND CAFFEINE 50; 325; 40 MG/1; MG/1; MG/1
TABLET ORAL
Qty: 30 TABLET | Refills: 0 | Status: SHIPPED | OUTPATIENT
Start: 2022-06-01

## 2022-06-16 PROCEDURE — 88342 IMHCHEM/IMCYTCHM 1ST ANTB: CPT | Performed by: PATHOLOGY

## 2022-06-16 PROCEDURE — 88312 SPECIAL STAINS GROUP 1: CPT | Performed by: PATHOLOGY

## 2022-06-16 PROCEDURE — 88341 IMHCHEM/IMCYTCHM EA ADD ANTB: CPT | Performed by: PATHOLOGY

## 2022-06-16 PROCEDURE — 88305 TISSUE EXAM BY PATHOLOGIST: CPT | Performed by: PATHOLOGY

## 2022-06-16 PROCEDURE — 88344 IMHCHEM/IMCYTCHM EA MLT ANTB: CPT | Performed by: PATHOLOGY

## 2022-08-25 RX ORDER — CETIRIZINE HYDROCHLORIDE 10 MG/1
10 TABLET ORAL DAILY
COMMUNITY

## 2022-08-25 NOTE — PRE-PROCEDURE INSTRUCTIONS
Pre-Surgery Instructions:   Medication Instructions    butalbital-acetaminophen-caffeine (FIORICET,ESGIC) -40 mg per tablet Continue to take as prescribed including DOS with a small sip of water, unless usually taken at night    cetirizine (ZyrTEC) 10 mg tablet Continue to take as prescribed including DOS with a small sip of water, unless usually taken at night    Multiple Vitamins-Calcium (ONE-A-DAY WOMENS FORMULA PO) Avoid 1 week prior to surgery     Patient instructed to avoid ASPIRIN, OTC vitamins and NSAIDS prior to surgery  Tylenol okay PRN  Patient instructed to continue scheduled medications excluding DOS  Patient given NPO instructions  Patient given CHG/antibacterial soap bathing instruction per protocol  Patient instructed to avoid using lotions, powders, oils, etc  DOS  Patient given up to date visitor guidelines  Patient instructed to have a ride home after surgery  Patient instructed to remove jewelry and not to bring valuables DOS  Patient informed that dentures and contact lenses will have to be removed for surgery  Patient understands he or she will receive a call the afternoon before surgery regarding an arrival time  Patient verbalized understanding of all instructions

## 2022-08-31 ENCOUNTER — ANESTHESIA (OUTPATIENT)
Dept: PERIOP | Facility: HOSPITAL | Age: 37
End: 2022-08-31
Payer: COMMERCIAL

## 2022-08-31 ENCOUNTER — ANESTHESIA EVENT (OUTPATIENT)
Dept: PERIOP | Facility: HOSPITAL | Age: 37
End: 2022-08-31
Payer: COMMERCIAL

## 2022-08-31 ENCOUNTER — HOSPITAL ENCOUNTER (OUTPATIENT)
Facility: HOSPITAL | Age: 37
Setting detail: OUTPATIENT SURGERY
Discharge: HOME | End: 2022-08-31
Attending: OTOLARYNGOLOGY | Admitting: OTOLARYNGOLOGY
Payer: COMMERCIAL

## 2022-08-31 VITALS
RESPIRATION RATE: 16 BRPM | BODY MASS INDEX: 26.31 KG/M2 | OXYGEN SATURATION: 95 % | DIASTOLIC BLOOD PRESSURE: 80 MMHG | TEMPERATURE: 98.1 F | HEIGHT: 62 IN | SYSTOLIC BLOOD PRESSURE: 121 MMHG | HEART RATE: 80 BPM | WEIGHT: 143 LBS

## 2022-08-31 DIAGNOSIS — K13.79 ORAL DYSPLASIA, ACQUIRED: ICD-10-CM

## 2022-08-31 DIAGNOSIS — K13.21 ORAL LEUKOPLAKIA: Primary | Chronic | ICD-10-CM

## 2022-08-31 LAB
EXT PREGNANCY TEST URINE: NEGATIVE
EXT. CONTROL: NORMAL

## 2022-08-31 PROCEDURE — 88309 TISSUE EXAM BY PATHOLOGIST: CPT | Performed by: PATHOLOGY

## 2022-08-31 PROCEDURE — 81025 URINE PREGNANCY TEST: CPT | Performed by: OTOLARYNGOLOGY

## 2022-08-31 PROCEDURE — 88341 IMHCHEM/IMCYTCHM EA ADD ANTB: CPT | Performed by: PATHOLOGY

## 2022-08-31 PROCEDURE — 88331 PATH CONSLTJ SURG 1 BLK 1SPC: CPT | Performed by: PATHOLOGY

## 2022-08-31 PROCEDURE — 88342 IMHCHEM/IMCYTCHM 1ST ANTB: CPT | Performed by: PATHOLOGY

## 2022-08-31 PROCEDURE — C9290 INJ, BUPIVACAINE LIPOSOME: HCPCS

## 2022-08-31 PROCEDURE — 41112 EXCISION OF TONGUE LESION: CPT | Performed by: OTOLARYNGOLOGY

## 2022-08-31 PROCEDURE — 41113 EXCISION OF TONGUE LESION: CPT | Performed by: OTOLARYNGOLOGY

## 2022-08-31 PROCEDURE — 88305 TISSUE EXAM BY PATHOLOGIST: CPT | Performed by: PATHOLOGY

## 2022-08-31 RX ORDER — ONDANSETRON 2 MG/ML
4 INJECTION INTRAMUSCULAR; INTRAVENOUS ONCE AS NEEDED
Status: DISCONTINUED | OUTPATIENT
Start: 2022-08-31 | End: 2022-08-31 | Stop reason: HOSPADM

## 2022-08-31 RX ORDER — FENTANYL CITRATE/PF 50 MCG/ML
50 SYRINGE (ML) INJECTION
Status: DISCONTINUED | OUTPATIENT
Start: 2022-08-31 | End: 2022-08-31 | Stop reason: HOSPADM

## 2022-08-31 RX ORDER — OXYCODONE HYDROCHLORIDE 10 MG/1
10 TABLET ORAL EVERY 4 HOURS PRN
Qty: 20 TABLET | Refills: 0 | Status: SHIPPED | OUTPATIENT
Start: 2022-08-31

## 2022-08-31 RX ORDER — ACETAMINOPHEN 325 MG/1
650 TABLET ORAL EVERY 6 HOURS PRN
Status: DISCONTINUED | OUTPATIENT
Start: 2022-08-31 | End: 2022-08-31 | Stop reason: HOSPADM

## 2022-08-31 RX ORDER — IBUPROFEN 400 MG/1
400 TABLET ORAL EVERY 6 HOURS PRN
Status: DISCONTINUED | OUTPATIENT
Start: 2022-08-31 | End: 2022-08-31 | Stop reason: HOSPADM

## 2022-08-31 RX ORDER — MIDAZOLAM HYDROCHLORIDE 2 MG/2ML
INJECTION, SOLUTION INTRAMUSCULAR; INTRAVENOUS AS NEEDED
Status: DISCONTINUED | OUTPATIENT
Start: 2022-08-31 | End: 2022-08-31

## 2022-08-31 RX ORDER — PROPOFOL 10 MG/ML
INJECTION, EMULSION INTRAVENOUS AS NEEDED
Status: DISCONTINUED | OUTPATIENT
Start: 2022-08-31 | End: 2022-08-31

## 2022-08-31 RX ORDER — ONDANSETRON 2 MG/ML
INJECTION INTRAMUSCULAR; INTRAVENOUS AS NEEDED
Status: DISCONTINUED | OUTPATIENT
Start: 2022-08-31 | End: 2022-08-31

## 2022-08-31 RX ORDER — ROCURONIUM BROMIDE 10 MG/ML
INJECTION, SOLUTION INTRAVENOUS AS NEEDED
Status: DISCONTINUED | OUTPATIENT
Start: 2022-08-31 | End: 2022-08-31

## 2022-08-31 RX ORDER — BUPIVACAINE HYDROCHLORIDE 5 MG/ML
INJECTION, SOLUTION EPIDURAL; INTRACAUDAL AS NEEDED
Status: DISCONTINUED | OUTPATIENT
Start: 2022-08-31 | End: 2022-08-31 | Stop reason: HOSPADM

## 2022-08-31 RX ORDER — FENTANYL CITRATE 50 UG/ML
INJECTION, SOLUTION INTRAMUSCULAR; INTRAVENOUS AS NEEDED
Status: DISCONTINUED | OUTPATIENT
Start: 2022-08-31 | End: 2022-08-31

## 2022-08-31 RX ORDER — LIDOCAINE HYDROCHLORIDE 10 MG/ML
INJECTION, SOLUTION EPIDURAL; INFILTRATION; INTRACAUDAL; PERINEURAL AS NEEDED
Status: DISCONTINUED | OUTPATIENT
Start: 2022-08-31 | End: 2022-08-31

## 2022-08-31 RX ORDER — DEXAMETHASONE SODIUM PHOSPHATE 10 MG/ML
INJECTION, SOLUTION INTRAMUSCULAR; INTRAVENOUS AS NEEDED
Status: DISCONTINUED | OUTPATIENT
Start: 2022-08-31 | End: 2022-08-31

## 2022-08-31 RX ORDER — SODIUM CHLORIDE, SODIUM LACTATE, POTASSIUM CHLORIDE, CALCIUM CHLORIDE 600; 310; 30; 20 MG/100ML; MG/100ML; MG/100ML; MG/100ML
100 INJECTION, SOLUTION INTRAVENOUS CONTINUOUS
Status: DISCONTINUED | OUTPATIENT
Start: 2022-08-31 | End: 2022-08-31 | Stop reason: HOSPADM

## 2022-08-31 RX ADMIN — ONDANSETRON 4 MG: 2 INJECTION INTRAMUSCULAR; INTRAVENOUS at 14:02

## 2022-08-31 RX ADMIN — Medication 50 MCG: at 15:17

## 2022-08-31 RX ADMIN — SUGAMMADEX 200 MG: 100 INJECTION, SOLUTION INTRAVENOUS at 14:56

## 2022-08-31 RX ADMIN — FENTANYL CITRATE 50 MCG: 50 INJECTION INTRAMUSCULAR; INTRAVENOUS at 14:01

## 2022-08-31 RX ADMIN — SODIUM CHLORIDE, SODIUM LACTATE, POTASSIUM CHLORIDE, AND CALCIUM CHLORIDE 100 ML/HR: .6; .31; .03; .02 INJECTION, SOLUTION INTRAVENOUS at 10:34

## 2022-08-31 RX ADMIN — MIDAZOLAM 2 MG: 1 INJECTION INTRAMUSCULAR; INTRAVENOUS at 13:52

## 2022-08-31 RX ADMIN — ROCURONIUM BROMIDE 30 MG: 50 INJECTION INTRAVENOUS at 14:01

## 2022-08-31 RX ADMIN — LIDOCAINE HYDROCHLORIDE 50 MG: 10 INJECTION, SOLUTION EPIDURAL; INFILTRATION; INTRACAUDAL; PERINEURAL at 14:01

## 2022-08-31 RX ADMIN — PROPOFOL 200 MG: 10 INJECTION, EMULSION INTRAVENOUS at 14:01

## 2022-08-31 RX ADMIN — FENTANYL CITRATE 50 MCG: 50 INJECTION INTRAMUSCULAR; INTRAVENOUS at 14:14

## 2022-08-31 RX ADMIN — DEXAMETHASONE SODIUM PHOSPHATE 10 MG: 10 INJECTION, SOLUTION INTRAMUSCULAR; INTRAVENOUS at 14:02

## 2022-08-31 NOTE — OP NOTE
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]    Procedure(s) (LRB):  RIGHT PARTIAL GLOSSECTOMY, KTP LASER FROZEN SECTIONS (Right)    Specimen(s):  ID Type Source Tests Collected by Time Destination   1 : ventral tongue Tissue Tongue TISSUE EXAM Amna Zamora MD 2022 1421    2 : posterior tongue Tissue Tongue TISSUE EXAM Amna Zamora MD 2022 1423    3 : anterior tongue Tissue Tongue TISSUE EXAM Amna Zamora MD 2022 1423    4 : right partial glossectomy stitch marks anterior Tissue Tongue TISSUE EXAM Amna Zamora MD 2022 1435        Estimated Blood Loss:   Minimal    Drains:  * No LDAs found *    Anesthesia Type:   General    Operative Indications:  Oral dysplasia, 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]      Operative Findings:  One small area of leukoplakia along the right lateral tongue nearly on the dorsum  Additional small area posteriorly with a component of erythroplakia    Complications:   None    Procedure and Technique:  The patient is to the operating room  She was placed supine the operating table and general anesthesia was induced  She was intubated with a laser safe tube  Table then turned 90°  She was then prepped and draped in the usual fashion with wet towels  A time-out was performed the patient's identity and procedure were then confirmed  A single silk sutures placed anterior tongue for retraction  A side mouth prop was placed on the left-hand side  The tongue was then exposed  This was visually assessed and there is visible thick leukoplakia along the right lateral dorsum of the tongue    There was a small area of erythroplakia along the posterior lateral right tongue of approximately 1 x 1 cm  An additional area of leukoplakia anterior to this was also visualized  Frozen sections were obtained from the right ventral aspect of the tongue at the junction of the floor of the mouth  Additional frozen section was taken from the erythroleukoplakic lesion in the anterior tongue  The area of scar, erythroplakia and leukoplakia was then excised in a submucosal fashion  Approximately 4 x 1 5 cm epithelium was removed  The KTP laser at a setting of 6 w was then used to ablate the mucosa along the margins of the leukoplakia nearly to the midline of the tongue, posteriorly to the glossotonsillar fold and inferiorly to the floor of the mouth  Several passes were made with the laser to ablate the superficial aspect of the mucosa  The mucosa at the border of the ablations was grossly normal   Hemostasis was achieved  Jeane Claus was used to block the R lingual nerve and additional local was placed into the bed of the resection  The patient was then turned back to anesthesia where she was woken and extubated  She tolerated the procedure well without complication     I was present for the entire procedure   I was present for the entire procedure    Patient Disposition:  PACU       SIGNATURE: Srini Abbasi MD  DATE: August 31, 2022  TIME: 5:53 PM

## 2022-08-31 NOTE — H&P
H&P Exam - ENT   Carmen Lira 39 y o  female MRN: [id number]  Unit/Bed#: OR [place] Encounter: [id number]    Assessment/Plan     Assessment:  39 F with R lateral tongue dysplasia  Plan:  OR for excision and KTP laser ablation R lateral tongue    History of Present Illness   HPI:  Carmen Lira is a 39 y o  female who presents with R lateral tongue dysplasia      Review of Systems    Historical Information   Past Medical History:   Diagnosis Date    Acquired hallux valgus of right foot     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    Migraines     Wears glasses      Past Surgical History:   Procedure Laterality Date    FOOT OSTEOTOMY Left 2/17/2017    Procedure: OSTECTOMY 1ST METATARSAL;  Surgeon: Victor Manuel Baptiste DPM;  [facility];  Service:     FOOT SURGERY      DC CORRJ HALLUX VALGUS W/SESMDC W/RESCJ PROX PHAL Right 6/12/2020    Procedure: Lakia Shen;  Surgeon: Victor Manuel Baptiste DPM;  [facility];  Service: Podiatry    DC ELECT BONE STIM NONINVASIVE Right 6/12/2020    Procedure: INSERTION BONE STIMULATOR;  Surgeon: Victor Manuel Baptiste DPM;  [facility];  Service: Podiatry    DC FUSION FOOT BONE,MIDTARSAL,1 JT Right 6/12/2020    Procedure: ARTHRODESIS / [facility];  Surgeon: Victor Manuel Baptiste DPM;  [facility];  Service: Podiatry    DC PART REMOVAL TONGUE,<1/2 Right 3/4/2020    Procedure: RIGHT PARTIAL GLOSSECTOMY W LASER;  Surgeon: Oscar Dickson MD;  [facility];  Service: ENT    TOE OSTEOTOMY Left 2/17/2017    Procedure: CLOSING BASE WEDGE OSTEOTOMY 1ST METATARSAL: APPLICATION OF SHORT LEG SPLINT;APPLICTION AND ACTIVATION OF BONE STIMULATOR;  Surgeon: Victor Manuel Baptiste DPM;  [facility];  Service:     VAGINAL DELIVERY      X 1    WISDOM TOOTH EXTRACTION      X 4     Social History   Social History     Substance and Sexual Activity   Alcohol Use Yes    Alcohol/week: 1 0 standard drink    Types: 1 Standard drinks or equivalent per week Comment: wine     Social History     Substance and Sexual Activity   Drug Use No     Social History     Tobacco Use   Smoking Status Former Smoker   Smokeless Tobacco Never Used   Tobacco Comment    quit age 12      E-Cigarette/Vaping   Jahaira Cruz E-Cigarette Use Never User      E-Cigarette/Vaping Substances    Nicotine No     THC No     CBD No     Flavoring No     Other No     Unknown No      Family History: non-contributory    Meds/Allergies   all medications and allergies reviewed  Allergies   Allergen Reactions    Penicillins Hives       Objective   Vitals: Blood pressure 119/82, pulse 75, temperature 98 °F (36 7 °C), temperature source Temporal, resp  rate 18, height 5' 2" (1 575 m), weight 64 9 kg (143 lb), SpO2 98 %, not currently breastfeeding  No intake or output data in the 24 hours ending 08/31/22 1240    Invasive Devices  Report    Peripheral Intravenous Line  Duration           Peripheral IV 08/31/22 Left Antecubital <1 day                Physical Exam   NAD  AAOx3  CTAB  RRR  Abd soft NT/ND  MAHER          Lab Results: I have personally reviewed pertinent lab results  Imaging: I have personally reviewed pertinent reports  EKG, Pathology, and Other Studies: I have personally reviewed pertinent reports  Code Status: No Order  Advance Directive and Living Will:      Power of :    POLST:      Counseling/Coordination of Care: Total floor / unit time spent today 25 minutes  Greater than 50% of total time was spent with the patient and / or family counseling and / or coordination of care   A description of the counseling / coordination of care: 25

## 2022-08-31 NOTE — ANESTHESIA POSTPROCEDURE EVALUATION
Post-Op Assessment Note    CV Status:  Stable  Pain Score: 0    Pain management: adequate     Mental Status:  Awake   Hydration Status:  Stable   PONV Controlled:  None   Airway Patency:  Patent      Post Op Vitals Reviewed: Yes      Staff: CRNA         No complications documented      BP   120/75   Temp      Pulse  92   Resp   12   SpO2   99

## 2022-08-31 NOTE — ANESTHESIA PREPROCEDURE EVALUATION
Procedure:  RIGHT PARTIAL GLOSSECTOMY, KTP LASER FROZEN SECTIONS (Right Throat)    Relevant Problems   CARDIO   (+) Migraine, unspecified, not intractable, without status migrainosus      NEURO/PSYCH   (+) Migraine, unspecified, not intractable, without status migrainosus      Prev R partial glossectomy 2020: mac3 gr1v, laser ett 7 0    Recent labs personally reviewed:  Lab Results   Component Value Date    WBC 6 8 08/18/2022    HGB 13 6 08/18/2022     08/18/2022     Lab Results   Component Value Date    K 4 2 08/18/2022    BUN 12 08/18/2022    CREATININE 0 82 08/18/2022     No results found for: PTT   No results found for: INR    No results found for: HGBA1C           Anesthesia Plan  ASA Score- 2     Anesthesia Type- general with ASA Monitors  Additional Monitors:   Airway Plan: ETT  Comment: Laser ETT  Plan Factors-Exercise tolerance (METS): >4 METS  Chart reviewed  Existing labs reviewed  Patient summary reviewed  Induction- intravenous  Postoperative Plan- Plan for postoperative opioid use   Planned trial extubation    Informed Consent-

## 2022-08-31 NOTE — DISCHARGE INSTRUCTIONS
Please alternate scheduled tylenol and ibuprofen for pain as directed on their package  Please use narcotic for breakthrough or severe pain  Thank you

## 2022-09-08 PROCEDURE — 88341 IMHCHEM/IMCYTCHM EA ADD ANTB: CPT | Performed by: PATHOLOGY

## 2022-09-08 PROCEDURE — 88309 TISSUE EXAM BY PATHOLOGIST: CPT | Performed by: PATHOLOGY

## 2022-09-08 PROCEDURE — 88305 TISSUE EXAM BY PATHOLOGIST: CPT | Performed by: PATHOLOGY

## 2022-09-08 PROCEDURE — 88342 IMHCHEM/IMCYTCHM 1ST ANTB: CPT | Performed by: PATHOLOGY

## 2022-12-05 ENCOUNTER — OFFICE VISIT (OUTPATIENT)
Dept: FAMILY MEDICINE CLINIC | Facility: HOSPITAL | Age: 37
End: 2022-12-05

## 2022-12-05 VITALS
DIASTOLIC BLOOD PRESSURE: 80 MMHG | BODY MASS INDEX: 26.87 KG/M2 | WEIGHT: 146 LBS | HEIGHT: 62 IN | SYSTOLIC BLOOD PRESSURE: 108 MMHG | HEART RATE: 60 BPM

## 2022-12-05 DIAGNOSIS — Z13.29 SCREENING FOR THYROID DISORDER: ICD-10-CM

## 2022-12-05 DIAGNOSIS — Z13.1 SCREENING FOR DIABETES MELLITUS: ICD-10-CM

## 2022-12-05 DIAGNOSIS — Z13.220 SCREENING, LIPID: Primary | ICD-10-CM

## 2022-12-05 DIAGNOSIS — R63.5 WEIGHT GAIN: ICD-10-CM

## 2022-12-05 DIAGNOSIS — Z83.49 FAMILY HISTORY OF THYROID DISEASE: ICD-10-CM

## 2022-12-05 NOTE — PROGRESS NOTES
[facility] [address]    NAME: Mamie Burton  AGE: 40 y o  SEX: female  : 1985   DATE: 2022     Assessment and Plan:      Diagnosis ICD-10-CM Associated Orders   1  Screening, lipid  Z13 220 Lipid Panel with Direct LDL reflex     Lipid Panel with Direct LDL reflex      2  Screening for thyroid disorder  Z13 29 TSH, 3rd generation     T4, free     TSH, 3rd generation     T4, free      3  Screening for diabetes mellitus  Z13 1 Comprehensive metabolic panel     Comprehensive metabolic panel      4  Weight gain  R63 5 Comprehensive metabolic panel     TSH, 3rd generation     Insulin, fasting     Comprehensive metabolic panel     TSH, 3rd generation      5  Family history of thyroid disease  Z83 49 TSH, 3rd generation     T4, free     TSH, 3rd generation     T4, free      Screening & diagnostic bloodwork ordered, our office will reach out with results once obtained  Ask GYN for blood hormone testing also  Mammos at 36 & colonoscopy at 38 yo  Immunizations and preventive care screenings were discussed with patient today  Appropriate education was printed on patient's after visit summary  Counseling:  Alcohol/drug use: discussed moderation in alcohol intake, the recommendations for healthy alcohol use, and avoidance of illicit drug use  Dental Health: discussed importance of regular tooth brushing, flossing, and dental visits  Injury prevention: discussed safety/seat belts, safety helmets, smoke detectors, carbon dioxide detectors, and smoking near bedding or upholstery  Sexual health: discussed sexually transmitted diseases, partner selection, use of condoms, avoidance of unintended pregnancy, and contraceptive alternatives  · Exercise: the importance of regular exercise/physical activity was discussed  Recommend exercise 3-5 times per week for at least 30 minutes  BMI Counseling: Body mass index is 26 7 kg/m²  The BMI is above normal  Nutrition recommendations include decreasing portion sizes, encouraging healthy choices of fruits and vegetables and limiting drinks that contain sugar  Exercise recommendations include moderate physical activity 150 minutes/week and exercising 3-5 times per week  Rationale for BMI follow-up plan is due to patient being overweight or obese  Depression Screening and Follow-up Plan: Patient was screened for depression during today's encounter  They screened negative with a PHQ-2 score of 0  Return in about 1 year (around 12/5/2023) for Annual Physical      Chief Complaint:     Chief Complaint   Patient presents with   • Annual Exam      History of Present Illness:     Adult Annual Physical   Patient here for a comprehensive physical exam  The patient reports no problems  Diet and Physical Activity  · Diet/Nutrition: regular, water only, no sugary drinks  · Exercise: moderate cardiovascular exercise and 5-7 times a week on average  · Weight won't change, thyroid issues in the family  · No Drug or Tobacco use  Alcohol use - no     Depression Screening  PHQ-2/9 Depression Screening    Little interest or pleasure in doing things: 0 - not at all  Feeling down, depressed, or hopeless: 0 - not at all  PHQ-2 Score: 0  PHQ-2 Interpretation: Negative depression screen       General Health   · Sleep: 3 yo interrupts  · Hearing: normal - bilateral   · Vision: goes for regular eye exams and wears glasses  · Dental: regular dental visits and brushes teeth twice daily  /GYN Health  · Last menstrual period: normal  · Contraceptive method: vasectomy  · History of STDs?: no    · 3 yo & 10 yo, one male partner, seeing [facility] this month     Review of Systems:     Review of Systems   Constitutional: Negative for chills and fever  HENT: Negative for congestion and sinus pain  Respiratory: Negative for cough and shortness of breath      Cardiovascular: Negative for chest pain and palpitations  Endocrine: Negative for cold intolerance, heat intolerance, polydipsia, polyphagia and polyuria  Neurological: Negative for dizziness, light-headedness and headaches (good lately)         Past Medical History:     Past Medical History:   Diagnosis Date   • Acquired hallux valgus of right foot    • 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   • Migraines    • Wears glasses       Past Surgical History:     Past Surgical History:   Procedure Laterality Date   • BREAST SURGERY  11/17/22   • FOOT OSTEOTOMY Left 02/17/2017    Procedure: OSTECTOMY 1ST METATARSAL;  Surgeon: Mei Gonsalves DPM;  [facility];  Service:    • FOOT SURGERY     • ME CORRJ HALLUX VALGUS W/SESMDC W/RESCJ PROX PHAL Right 06/12/2020    Procedure: Martinez Mejia;  Surgeon: Mei Gonsalves DPM;  [facility];  Service: Podiatry   • ME ELECT BONE STIM NONINVASIVE Right 06/12/2020    Procedure: INSERTION BONE STIMULATOR;  Surgeon: Mei Gonsalves DPM;  [facility];  Service: Podiatry   • ME FUSION FOOT BONE,MIDTARSAL,1 JT Right 06/12/2020    Procedure: ARTHRODESIS / [facility];  Surgeon: Mei Gonsalves DPM;  [facility];  Service: Podiatry   • ME PART REMOVAL TONGUE,<1/2 Right 03/04/2020    Procedure: RIGHT PARTIAL GLOSSECTOMY W LASER;  Surgeon: Paulino Martinez MD;  [facility];  Service: ENT   • ME PART REMOVAL TONGUE,<1/2 Right 08/31/2022    Procedure: RIGHT PARTIAL GLOSSECTOMY, KTP LASER FROZEN SECTIONS;  Surgeon: Paulino Martinez MD;  [facility];  Service: ENT   • TOE OSTEOTOMY Left 02/17/2017    Procedure: CLOSING BASE WEDGE OSTEOTOMY 1ST METATARSAL: APPLICATION OF SHORT LEG SPLINT;APPLICTION AND ACTIVATION OF BONE STIMULATOR;  Surgeon: Mei Gonsalves DPM;  [facility];  Service:    • VAGINAL DELIVERY      X 1   • WISDOM TOOTH EXTRACTION      X 4      Social History:     Social History     Socioeconomic History   • Marital status: /Civil Union Spouse name: None   • Number of children: None   • Years of education: None   • Highest education level: None   Occupational History   • None   Tobacco Use   • Smoking status: Former   • Smokeless tobacco: Never   • Tobacco comments:     quit age 12    Vaping Use   • Vaping Use: Never used   Substance and Sexual Activity   • Alcohol use: Yes     Alcohol/week: 1 0 standard drink     Types: 1 Glasses of wine per week     Comment: wine   • Drug use: No   • Sexual activity: Yes     Partners: Male   Other Topics Concern   • None   Social History Narrative    Always uses seat belt    Dental care, regularly    Employed    No advance directives    Occasional caffeine consumption    Recent change in lifestyle    Supportive and safe    Lives with       Social Determinants of Health     Financial Resource Strain: Not on file   Food Insecurity: Not on file   Transportation Needs: Not on file   Physical Activity: Not on file   Stress: Not on file   Social Connections: Not on file   Intimate Partner Violence: Not on file   Housing Stability: Not on file      Family History:     Family History   Problem Relation Age of Onset   • Heart disease Family         Cardiovascular    • Substance Abuse Neg Hx    • Mental illness Neg Hx       Current Medications:     Current Outpatient Medications   Medication Sig Dispense Refill   • butalbital-acetaminophen-caffeine (FIORICET,ESGIC) -40 mg per tablet TAKE 1 TABLET BY MOUTH EVERY 4 HOURS AS NEEDED FOR HEADACHES 30 tablet 0   • Multiple Vitamins-Calcium (ONE-A-DAY WOMENS FORMULA PO) Take by mouth daily        No current facility-administered medications for this visit  Allergies: Allergies   Allergen Reactions   • Penicillins Hives      Physical Exam:     /80   Pulse 60   Ht 5' 2" (1 575 m)   Wt 66 2 kg (146 lb)   BMI 26 70 kg/m²     Physical Exam  Vitals reviewed  Constitutional:       General: She is not in acute distress  Appearance: Normal appearance  She is normal weight  She is not ill-appearing  HENT:      Head: Normocephalic and atraumatic  Right Ear: Tympanic membrane, ear canal and external ear normal  There is no impacted cerumen  Left Ear: Tympanic membrane, ear canal and external ear normal  There is no impacted cerumen  Nose: Nose normal  No congestion or rhinorrhea  Mouth/Throat:      Mouth: Mucous membranes are moist       Pharynx: Oropharynx is clear  No oropharyngeal exudate or posterior oropharyngeal erythema  Eyes:      General: No scleral icterus  Right eye: No discharge  Left eye: No discharge  Conjunctiva/sclera: Conjunctivae normal    Neck:      Comments: No thyromegaly   Cardiovascular:      Rate and Rhythm: Normal rate and regular rhythm  Pulses: Normal pulses  Heart sounds: Normal heart sounds  No murmur heard  No friction rub  No gallop  Pulmonary:      Effort: Pulmonary effort is normal  No respiratory distress  Breath sounds: Normal breath sounds  No stridor  No wheezing  Musculoskeletal:         General: Normal range of motion  Cervical back: Normal range of motion and neck supple  No rigidity or tenderness  Right lower leg: No edema  Left lower leg: No edema  Lymphadenopathy:      Cervical: No cervical adenopathy  Skin:     General: Skin is warm and dry  Capillary Refill: Capillary refill takes less than 2 seconds  Coloration: Skin is not jaundiced or pale  Neurological:      Mental Status: She is alert and oriented to person, place, and time  Gait: Gait normal    Psychiatric:         Mood and Affect: Mood normal          Behavior: Behavior normal          Thought Content:  Thought content normal          Judgment: Judgment normal           DO Bon Giraldo [address]

## 2022-12-21 ENCOUNTER — TELEPHONE (OUTPATIENT)
Dept: FAMILY MEDICINE CLINIC | Facility: HOSPITAL | Age: 37
End: 2022-12-21

## 2022-12-21 NOTE — TELEPHONE ENCOUNTER
Went to gyn to ask about hormonal testing as you suggested and they were not interested in doing that  Do you want to send her for the testing somewhere else? Please call

## 2022-12-22 DIAGNOSIS — R63.5 WEIGHT GAIN: Primary | ICD-10-CM

## 2022-12-23 LAB
ALBUMIN SERPL-MCNC: 5.1 G/DL (ref 3.8–4.8)
ALBUMIN/GLOB SERPL: 2.7 {RATIO} (ref 1.2–2.2)
ALP SERPL-CCNC: 51 IU/L (ref 44–121)
ALT SERPL-CCNC: 21 IU/L (ref 0–32)
AST SERPL-CCNC: 20 IU/L (ref 0–40)
BILIRUB SERPL-MCNC: 0.3 MG/DL (ref 0–1.2)
BUN SERPL-MCNC: 14 MG/DL (ref 6–20)
BUN/CREAT SERPL: 17 (ref 9–23)
CALCIUM SERPL-MCNC: 10.2 MG/DL (ref 8.7–10.2)
CHLORIDE SERPL-SCNC: 102 MMOL/L (ref 96–106)
CHOLEST SERPL-MCNC: 238 MG/DL (ref 100–199)
CO2 SERPL-SCNC: 23 MMOL/L (ref 20–29)
CREAT SERPL-MCNC: 0.82 MG/DL (ref 0.57–1)
EGFR: 94 ML/MIN/1.73
GLOBULIN SER-MCNC: 1.9 G/DL (ref 1.5–4.5)
GLUCOSE SERPL-MCNC: 90 MG/DL (ref 70–99)
HDLC SERPL-MCNC: 92 MG/DL
INSULIN SERPL-ACNC: 5.8 UIU/ML (ref 2.6–24.9)
LDLC SERPL CALC-MCNC: 134 MG/DL (ref 0–99)
LDLC/HDLC SERPL: 1.5 RATIO (ref 0–3.2)
POTASSIUM SERPL-SCNC: 4.3 MMOL/L (ref 3.5–5.2)
PROT SERPL-MCNC: 7 G/DL (ref 6–8.5)
SL AMB VLDL CHOLESTEROL CALC: 12 MG/DL (ref 5–40)
SODIUM SERPL-SCNC: 139 MMOL/L (ref 134–144)
T4 FREE SERPL-MCNC: 1.22 NG/DL (ref 0.82–1.77)
TRIGL SERPL-MCNC: 69 MG/DL (ref 0–149)
TSH SERPL DL<=0.005 MIU/L-ACNC: 1.53 UIU/ML (ref 0.45–4.5)

## 2022-12-24 LAB
FSH SERPL-ACNC: 11.4 MIU/ML
LH SERPL-ACNC: 6.1 MIU/ML

## 2023-01-04 ENCOUNTER — TELEPHONE (OUTPATIENT)
Dept: FAMILY MEDICINE CLINIC | Facility: HOSPITAL | Age: 38
End: 2023-01-04

## 2023-01-04 NOTE — TELEPHONE ENCOUNTER
Spoke w/ patient and gave lab result notes  Regarding her elevated LDL - advised lean meats, low fat food, etc   Patient eats a lot of deer meat  But also her dad has high LDL  Patient asking for any other specific instructions? She has heard about taking fish oil and/or garlique?     pls advise

## 2023-09-14 ENCOUNTER — TELEPHONE (OUTPATIENT)
Dept: FAMILY MEDICINE CLINIC | Facility: HOSPITAL | Age: 38
End: 2023-09-14

## 2023-09-14 NOTE — TELEPHONE ENCOUNTER
Hi, this is [place], Date of birth 1985. I need a referral sent over to Doctor Sheila Milton. He is with the [facility]. 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 They informed me that I didn't. I need a new referral this morning. My appointment is at 10:30 this morning. If you could just give me a call back that you got this [phone]. Thank you.

## 2023-09-14 NOTE — TELEPHONE ENCOUNTER
Left message for patient to call back with additional information needed to for Insurance referral to ENT.     Provided call back number [phone]

## 2023-09-21 DIAGNOSIS — G43.009 MIGRAINE WITHOUT AURA AND WITHOUT STATUS MIGRAINOSUS, NOT INTRACTABLE: ICD-10-CM

## 2023-09-21 RX ORDER — BUTALBITAL, ACETAMINOPHEN AND CAFFEINE 50; 325; 40 MG/1; MG/1; MG/1
1 TABLET ORAL EVERY 4 HOURS PRN
Qty: 30 TABLET | Refills: 0 | Status: SHIPPED | OUTPATIENT
Start: 2023-09-21

## 2023-12-06 ENCOUNTER — OFFICE VISIT (OUTPATIENT)
Dept: FAMILY MEDICINE CLINIC | Facility: HOSPITAL | Age: 38
End: 2023-12-06
Payer: COMMERCIAL

## 2023-12-06 VITALS
BODY MASS INDEX: 27.97 KG/M2 | HEIGHT: 62 IN | OXYGEN SATURATION: 99 % | DIASTOLIC BLOOD PRESSURE: 92 MMHG | HEART RATE: 72 BPM | SYSTOLIC BLOOD PRESSURE: 130 MMHG | WEIGHT: 152 LBS

## 2023-12-06 DIAGNOSIS — Z83.49 FAMILY HISTORY OF THYROID DISEASE: ICD-10-CM

## 2023-12-06 DIAGNOSIS — Z13.0 SCREENING FOR IRON DEFICIENCY ANEMIA: ICD-10-CM

## 2023-12-06 DIAGNOSIS — Z11.59 NEED FOR HEPATITIS C SCREENING TEST: ICD-10-CM

## 2023-12-06 DIAGNOSIS — Z00.00 ROUTINE ADULT HEALTH MAINTENANCE: Primary | ICD-10-CM

## 2023-12-06 DIAGNOSIS — G43.009 MIGRAINE WITHOUT AURA AND WITHOUT STATUS MIGRAINOSUS, NOT INTRACTABLE: ICD-10-CM

## 2023-12-06 DIAGNOSIS — R63.5 WEIGHT GAIN: ICD-10-CM

## 2023-12-06 PROCEDURE — 99395 PREV VISIT EST AGE 18-39: CPT | Performed by: STUDENT IN AN ORGANIZED HEALTH CARE EDUCATION/TRAINING PROGRAM

## 2023-12-06 NOTE — PROGRESS NOTES
[address] [facility] PRIMARY CARE [address]    NAME: Martita Gill  AGE: 45 y.o. SEX: female  : 1985   DATE: 2023     Assessment and Plan:      Diagnosis ICD-10-CM Associated Orders   1. Routine adult health maintenance  Z00.00       2. Migraine without aura and without status migrainosus, not intractable  G43.009       3. Weight gain  R63.5 Comprehensive metabolic panel     Lipid panel     LDL cholesterol, direct     Comprehensive metabolic panel     Lipid panel     LDL cholesterol, direct      4. Family history of thyroid disease  Z83.49 TSH, 3rd generation with Free T4 reflex     TSH, 3rd generation with Free T4 reflex      5. Screening for iron deficiency anemia  Z13.0 CBC and differential     CBC and differential      6. Need for hepatitis C screening test  Z11.59 Hepatitis C antibody     Hepatitis C antibody        Immunizations and preventive care screenings were discussed with patient today. Appropriate education was printed on patient's after visit summary. Counseling:  Alcohol/drug use: discussed moderation in alcohol intake, the recommendations for healthy alcohol use, and avoidance of illicit drug use. Dental Health: discussed importance of regular tooth brushing, flossing, and dental visits. Injury prevention: discussed safety/seat belts, safety helmets, smoke detectors, carbon dioxide detectors, and smoking near bedding or upholstery. Sexual health: discussed sexually transmitted diseases, partner selection, use of condoms, avoidance of unintended pregnancy, and contraceptive alternatives. Exercise: the importance of regular exercise/physical activity was discussed. Recommend exercise 3-5 times per week for at least 30 minutes. Depression Screening and Follow-up Plan: Patient was screened for depression during today's encounter. They screened negative with a PHQ-2 score of 0.         Return in about 9 months (around 9/6/2024) for Annual Physical.     Chief Complaint:     Chief Complaint   Patient presents with    Physical Exam      History of Present Illness:     Adult Annual Physical   Patient here for a comprehensive physical exam.  Migraine hx, was worse before, rare now    Wt Readings from Last 3 Encounters:   12/06/23 68.9 kg (152 lb)   09/14/23 66.2 kg (146 lb)   12/08/22 66.2 kg (146 lb)     Temp Readings from Last 3 Encounters:   09/15/22 (!) 96.9 °F (36.1 °C)   08/31/22 98.1 °F (36.7 °C)   07/28/22 (!) 96.7 °F (35.9 °C)     BP Readings from Last 3 Encounters:   12/06/23 130/92   12/05/22 108/80   08/31/22 121/80     Pulse Readings from Last 3 Encounters:   12/06/23 72   12/05/22 60   08/31/22 80     Diet and Physical Activity  Diet/Nutrition:  portion control, likes all types of food . Exercise: walking, moderate cardiovascular exercise, strength training exercises, and 5-7 times a week on average. No Drug use. Tobacco use:  reports that she has quit smoking. She has never used smokeless tobacco.  Alcohol use - occasional     Depression Screening  PHQ-2/9 Depression Screening    Little interest or pleasure in doing things: 0 - not at all  Feeling down, depressed, or hopeless: 0 - not at all  PHQ-2 Score: 0  PHQ-2 Interpretation: Negative depression screen       General Health  Sleep: sleeps well and magnesium is helping  . Hearing: normal - bilateral.  Vision: goes for regular eye exams and wears glasses. Dental: regular dental visits and brushes teeth twice daily. /GYN Health  Follows with gynecology? no   Last menstrual period: regular monthly  Contraceptive method: male partner had vasectomy. History of STDs?: no.      Review of Systems:     Review of Systems   Constitutional:  Negative for chills and fever. Respiratory:  Negative for cough and shortness of breath. Cardiovascular:  Negative for chest pain and palpitations. Neurological:  Positive for headaches (rare]).  Negative for dizziness and light-headedness.       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    Migraines     Wears glasses       Past Surgical History:     Past Surgical History:   Procedure Laterality Date    BREAST SURGERY  11/17/22    FOOT OSTEOTOMY Left 02/17/2017    Procedure: OSTECTOMY 1ST METATARSAL;  Surgeon: Jade Vaughan DPM;  [facility];  Service:     FOOT SURGERY      NM ARTHRODESIS MIDTARSOMETATARSAL SINGLE JOINT Right 06/12/2020    Procedure: ARTHRODESIS / [address];  Surgeon: Jade Vaughan DPM;  [facility];  Service: Podiatry    NM CORRJ [address] W/SESMDC W/RESCJ PROX PHAL Right 06/12/2020    Procedure: Vinh Sickle;  Surgeon: Jade Vaughan DPM;  [facility];  Service: Podiatry    NM ELECTRICAL STIMULATION BONE HEALING NONINVASIVE Right 06/12/2020    Procedure: INSERTION BONE STIMULATOR;  Surgeon: Jade Vaughan DPM;  [facility];  Service: Podiatry    NM GLOSSECTOMY <ONE-HALF TONGUE Right 03/04/2020    Procedure: RIGHT PARTIAL GLOSSECTOMY W LASER;  Surgeon: Alea Amador MD;  [facility];  Service: ENT    NM GLOSSECTOMY <ONE-HALF TONGUE Right 08/31/2022    Procedure: RIGHT PARTIAL GLOSSECTOMY, KTP LASER FROZEN SECTIONS;  Surgeon: Alea Amador MD;  [facility];  Service: ENT    TOE OSTEOTOMY Left 02/17/2017    Procedure: CLOSING BASE WEDGE OSTEOTOMY 1ST METATARSAL: APPLICATION OF SHORT LEG SPLINT;APPLICTION AND ACTIVATION OF BONE STIMULATOR;  Surgeon: Jade Vaughan DPM;  [facility];  Service:     VAGINAL DELIVERY      X 1    WISDOM TOOTH EXTRACTION      X 4      Social History:     Social History     Socioeconomic History    Marital status: /Civil Union     Spouse name: None    Number of children: None    Years of education: None    Highest education level: None   Occupational History    None   Tobacco Use Smoking status: Former    Smokeless tobacco: Never    Tobacco comments:     quit age 12    Vaping Use    Vaping Use: Never used   Substance and Sexual Activity    Alcohol use: Yes     Alcohol/week: 1.0 standard drink of alcohol     Types: 1 Glasses of wine per week     Comment: wine    Drug use: No    Sexual activity: Yes     Partners: Male   Other Topics Concern    None   Social History Narrative    Always uses seat belt    Dental care, regularly    Employed    No advance directives    Occasional caffeine consumption    Recent change in lifestyle    Supportive and safe    Lives with       Social Determinants of Health     Financial Resource Strain: Not on file   Food Insecurity: Not on file   Transportation Needs: No Transportation Needs (11/9/2020)    PRAPARE - Transportation     Lack of Transportation (Medical): No     Lack of Transportation (Non-Medical): No   Physical Activity: Sufficiently Active (11/9/2020)    Exercise Vital Sign     Days of Exercise per Week: 3 days     Minutes of Exercise per Session: 60 min   Stress: Stress Concern Present (11/9/2020)    [facility]     Feeling of Stress :  To some extent   Social Connections: Not on file   Intimate Partner Violence: Not At Risk (11/9/2020)    Humiliation, Afraid, Rape, and Kick questionnaire     Fear of Current or Ex-Partner: No     Emotionally Abused: No     Physically Abused: No     Sexually Abused: No   Housing Stability: Not on file      Family History:     Family History   Problem Relation Age of Onset    Heart disease Family         Cardiovascular     Substance Abuse Neg Hx     Mental illness Neg Hx       Current Medications:     Current Outpatient Medications   Medication Sig Dispense Refill    butalbital-acetaminophen-caffeine (FIORICET,ESGIC) -40 mg per tablet Take 1 tablet by mouth every 4 (four) hours as needed for headaches 30 tablet 0    MAGNESIUM GLYCINATE PO Take 200 mg by mouth      Multiple Vitamins-Calcium (ONE-A-DAY WOMENS FORMULA PO) Take by mouth daily        No current facility-administered medications for this visit. Allergies: Allergies   Allergen Reactions    Penicillins Hives      Physical Exam:     /92   Pulse 72   Ht 5' 2" (1.575 m)   Wt 68.9 kg (152 lb)   SpO2 99%   BMI 27.80 kg/m²     Physical Exam  Vitals reviewed. Constitutional:       General: She is not in acute distress. Appearance: Normal appearance. She is normal weight. She is not ill-appearing. HENT:      Head: Normocephalic and atraumatic. Right Ear: Tympanic membrane, ear canal and external ear normal. There is no impacted cerumen. Left Ear: Tympanic membrane, ear canal and external ear normal. There is no impacted cerumen. Nose: Nose normal. No congestion or rhinorrhea. Mouth/Throat:      Mouth: Mucous membranes are moist.      Pharynx: Oropharynx is clear. No oropharyngeal exudate or posterior oropharyngeal erythema. Eyes:      General: No scleral icterus. Right eye: No discharge. Left eye: No discharge. Conjunctiva/sclera: Conjunctivae normal.   Cardiovascular:      Rate and Rhythm: Normal rate and regular rhythm. Pulses: Normal pulses. Heart sounds: Normal heart sounds. No murmur heard. No friction rub. No gallop. Pulmonary:      Effort: Pulmonary effort is normal. No respiratory distress. Breath sounds: Normal breath sounds. No stridor. No wheezing. Musculoskeletal:         General: No swelling or tenderness. Normal range of motion. Cervical back: Normal range of motion and neck supple. No rigidity. Right lower leg: No edema. Left lower leg: No edema. Lymphadenopathy:      Cervical: No cervical adenopathy. Skin:     General: Skin is warm and dry. Capillary Refill: Capillary refill takes less than 2 seconds. Coloration: Skin is not jaundiced or pale.    Neurological: Mental Status: She is alert and oriented to person, place, and time. Gait: Gait normal.   Psychiatric:         Mood and Affect: Mood normal.         Behavior: Behavior normal.         Thought Content:  Thought content normal.         Judgment: Judgment normal.        Ligia Sharp DO   [address]

## 2023-12-17 ENCOUNTER — OFFICE VISIT (OUTPATIENT)
Dept: URGENT CARE | Facility: CLINIC | Age: 38
End: 2023-12-17
Payer: COMMERCIAL

## 2023-12-17 VITALS
BODY MASS INDEX: 27.6 KG/M2 | SYSTOLIC BLOOD PRESSURE: 123 MMHG | WEIGHT: 150 LBS | TEMPERATURE: 97.9 F | HEART RATE: 89 BPM | OXYGEN SATURATION: 100 % | HEIGHT: 62 IN | DIASTOLIC BLOOD PRESSURE: 78 MMHG | RESPIRATION RATE: 18 BRPM

## 2023-12-17 DIAGNOSIS — J01.10 ACUTE NON-RECURRENT FRONTAL SINUSITIS: Primary | ICD-10-CM

## 2023-12-17 PROCEDURE — 99213 OFFICE O/P EST LOW 20 MIN: CPT | Performed by: FAMILY MEDICINE

## 2023-12-17 RX ORDER — METHYLPREDNISOLONE 4 MG/1
TABLET ORAL
Qty: 21 TABLET | Refills: 0 | Status: SHIPPED | OUTPATIENT
Start: 2023-12-17

## 2023-12-17 RX ORDER — AZITHROMYCIN 250 MG/1
TABLET, FILM COATED ORAL
Qty: 6 TABLET | Refills: 0 | Status: SHIPPED | OUTPATIENT
Start: 2023-12-17 | End: 2023-12-21

## 2023-12-17 NOTE — PROGRESS NOTES
[facility]        NAME: Anali Heard is a 38 y.o. female  : 1985    MRN: [id number]  DATE: 2023  TIME: 1:34 PM    Assessment and Plan   Acute non-recurrent frontal sinusitis [J01.10]  1. Acute non-recurrent frontal sinusitis  azithromycin (ZITHROMAX) 250 mg tablet    methylPREDNISolone 4 MG tablet therapy pack            Patient Instructions       Follow up with PCP in 3-5 days.  Proceed to  ER if symptoms worsen.    Chief Complaint     Chief Complaint   Patient presents with    Sore Throat     Pt states she has had a a tickle in her throat for the last few days. Pt also states she had some congestion. Pt has taken cold medication, but it has not alleviated her symptoms.          History of Present Illness       38-year-old female with 1 week history of increased nasal congestion, sinus pressure, sore throat and pressure in her ears.        Review of Systems   Review of Systems   Constitutional: Negative.    HENT:  Positive for congestion and sinus pressure.    Eyes: Negative.    Respiratory:  Positive for cough.    Cardiovascular: Negative.    Gastrointestinal: Negative.    Genitourinary: Negative.    Skin: Negative.    Allergic/Immunologic: Negative.    Neurological: Negative.    Hematological: Negative.    Psychiatric/Behavioral: Negative.           Current Medications       Current Outpatient Medications:     azithromycin (ZITHROMAX) 250 mg tablet, Take 2 tablets today then 1 tablet daily x 4 days, Disp: 6 tablet, Rfl: 0    MAGNESIUM GLYCINATE PO, Take 200 mg by mouth, Disp: , Rfl:     methylPREDNISolone 4 MG tablet therapy pack, Use as directed on package, Disp: 21 tablet, Rfl: 0    Multiple Vitamins-Calcium (ONE-A-DAY WOMENS FORMULA PO), Take by mouth daily , Disp: , Rfl:     butalbital-acetaminophen-caffeine (FIORICET,ESGIC) -40 mg per tablet, Take 1 tablet by mouth every 4 (four) hours as needed for headaches, Disp: 30 tablet, Rfl: 0    Current Allergies     Allergies  as of 12/17/2023 - Reviewed 12/17/2023   Allergen Reaction Noted    Penicillins Hives             The following portions of the patient's history were reviewed and updated as appropriate: allergies, current medications, past family history, past medical history, past social history, past surgical history and problem list.     Past Medical History:   Diagnosis Date    Acquired hallux valgus of right foot     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    Migraines     Wears glasses        Past Surgical History:   Procedure Laterality Date    BREAST SURGERY  11/17/22    FOOT OSTEOTOMY Left 02/17/2017    Procedure: OSTECTOMY 1ST METATARSAL;  Surgeon: Gunner Thomas DPM;  Location: [facility];  Service:     FOOT SURGERY      CA ARTHRODESIS MIDTARSOMETATARSAL SINGLE JOINT Right 06/12/2020    Procedure: ARTHRODESIS / FUSION 1ST TARSOMETATARSAL JOINT;  Surgeon: Gunner Thomas DPM;  [facility];  Service: Podiatry    CA CORRJ HALLUX VALGUS W/SESMDC W/RESCJ PROX PHAL Right 06/12/2020    Procedure: BUNIONECTOMY CHRISTIAN;  Surgeon: Gunner Thomas DPM;  [facility];  Service: Podiatry    CA ELECTRICAL STIMULATION BONE HEALING NONINVASIVE Right 06/12/2020    Procedure: INSERTION BONE STIMULATOR;  Surgeon: Gunner Thomas DPM;  [facility];  Service: Podiatry    CA GLOSSECTOMY <ONE-HALF TONGUE Right 03/04/2020    Procedure: RIGHT PARTIAL GLOSSECTOMY W LASER;  Surgeon: Adam Crowley MD;  [facility];  Service: ENT    CA GLOSSECTOMY <ONE-HALF TONGUE Right 08/31/2022    Procedure: RIGHT PARTIAL GLOSSECTOMY, KTP LASER FROZEN SECTIONS;  Surgeon: Adam Crowley MD;  [facility];  Service: ENT    TOE OSTEOTOMY Left 02/17/2017    Procedure: CLOSING BASE WEDGE OSTEOTOMY 1ST METATARSAL: APPLICATION OF SHORT LEG SPLINT;APPLICTION AND ACTIVATION OF BONE STIMULATOR;  Surgeon: Gunner Thomas DPM;  Location[facility];  Service:     VAGINAL DELIVERY      X 1    WISDOM TOOTH EXTRACTION      X 4       Family  "History   Problem Relation Age of Onset    Heart disease Family         Cardiovascular     Substance Abuse Neg Hx     Mental illness Neg Hx          Medications have been verified.        Objective   /78   Pulse 89   Temp 97.9 °F (36.6 °C)   Resp 18   Ht 5' 2\" (1.575 m)   Wt 68 kg (150 lb)   SpO2 100%   BMI 27.44 kg/m²   No LMP recorded.       Physical Exam     Physical Exam  Vitals and nursing note reviewed.   Constitutional:       Appearance: She is well-developed.   HENT:      Head: Normocephalic.      Nose: Congestion present.      Mouth/Throat:      Pharynx: Posterior oropharyngeal erythema present. No oropharyngeal exudate.      Tonsils: No tonsillar exudate.   Eyes:      Pupils: Pupils are equal, round, and reactive to light.   Cardiovascular:      Rate and Rhythm: Normal rate.   Pulmonary:      Effort: Pulmonary effort is normal.   Abdominal:      General: Abdomen is flat.      Palpations: Abdomen is soft.   Musculoskeletal:         General: Normal range of motion.      Cervical back: Normal range of motion.   Skin:     General: Skin is warm and dry.   Neurological:      Mental Status: She is alert and oriented to person, place, and time.                   "

## 2023-12-18 ENCOUNTER — TELEPHONE (OUTPATIENT)
Dept: FAMILY MEDICINE CLINIC | Facility: HOSPITAL | Age: 38
End: 2023-12-18

## 2023-12-18 NOTE — TELEPHONE ENCOUNTER
Patient left the following message:    I was in, I want to say around like December 6th for my physical with Doctor Josse and around that same time I started with like a scratchy throat. I didn't mention anything where I was there because they had just started and it wasn't anything worrisome, but I still have it. I don't have. I mean, I had some congestion last week, but as of now I mean all I have is this annoying, scratchy throat. It is really bad at night, like it's stopping me from falling asleep because I just keep coughing to get rid of it. My throat doesn't hurt or anything, it's just that annoying, scratchy feeling. I had my daughter urgent care yesterday and figured I would get myself looked at. They did give me a steroid and AZ pack. I've tried warm salt water rinses, tea with honey, the chloraseptic sprays over the counter, medications. I didn't know if there's anything else I could try to help with this scratchy throat feeling. So if you could just give me a call back [phone]. Thank you so much. Ramiro.

## 2023-12-18 NOTE — TELEPHONE ENCOUNTER
Pt aware Dr. Loaiza is out of the office today. I advised pt take medications prescribed by UC and if not feeling better to call the office on Wednesday

## 2024-01-17 ENCOUNTER — TELEPHONE (OUTPATIENT)
Dept: FAMILY MEDICINE CLINIC | Facility: HOSPITAL | Age: 39
End: 2024-01-17

## 2024-01-17 NOTE — TELEPHONE ENCOUNTER
PATIENT RECEIVED BILL FOR $15 COPAY FOR DOS [id number]    VISIT WAS BILLED AS PE AND OV    PATIENT ADAMANT THAT IT WAS ONLY A PE    PLEASE REVIEW    PCB

## 2024-02-15 PROBLEM — J01.10 ACUTE NON-RECURRENT FRONTAL SINUSITIS: Status: RESOLVED | Noted: 2023-12-17 | Resolved: 2024-02-15

## 2024-02-21 PROBLEM — Z00.00 HEALTH CARE MAINTENANCE: Status: RESOLVED | Noted: 2019-10-31 | Resolved: 2024-02-21

## 2024-03-12 ENCOUNTER — TELEPHONE (OUTPATIENT)
Dept: FAMILY MEDICINE CLINIC | Facility: HOSPITAL | Age: 39
End: 2024-03-12

## 2024-03-12 DIAGNOSIS — K13.21 ORAL LEUKOPLAKIA: Primary | Chronic | ICD-10-CM

## 2024-03-12 NOTE — TELEPHONE ENCOUNTER
Pt left message on med refill line asking for a referral to Dr Crowley for ENT. Their NPI number is [id number].  They are scheduled to see them this month on the 21st.

## 2024-03-31 ENCOUNTER — OFFICE VISIT (OUTPATIENT)
Dept: URGENT CARE | Facility: CLINIC | Age: 39
End: 2024-03-31
Payer: COMMERCIAL

## 2024-03-31 VITALS
SYSTOLIC BLOOD PRESSURE: 122 MMHG | WEIGHT: 148.2 LBS | HEART RATE: 82 BPM | RESPIRATION RATE: 18 BRPM | DIASTOLIC BLOOD PRESSURE: 87 MMHG | TEMPERATURE: 98.8 F | OXYGEN SATURATION: 98 % | BODY MASS INDEX: 27.11 KG/M2

## 2024-03-31 DIAGNOSIS — B34.9 VIRAL INFECTION: Primary | ICD-10-CM

## 2024-03-31 PROCEDURE — 99213 OFFICE O/P EST LOW 20 MIN: CPT

## 2024-03-31 RX ORDER — IPRATROPIUM BROMIDE 42 UG/1
SPRAY, METERED NASAL
COMMUNITY
Start: 2024-03-27

## 2024-03-31 NOTE — PATIENT INSTRUCTIONS
Saline nasal spray as often as needed  Flonase nasal spray 1 spray in each nostril daily  Mucinex for congestion as directed  Use K-Y jelly to lubricate the inside your nose  You can use him humidifier  Rest and increase your fluids  Gargle with salt water  You can suck on lozenges for your throat

## 2024-04-02 ENCOUNTER — TELEPHONE (OUTPATIENT)
Dept: FAMILY MEDICINE CLINIC | Facility: HOSPITAL | Age: 39
End: 2024-04-02

## 2024-04-02 NOTE — TELEPHONE ENCOUNTER
See call-on recall- pt seen at , told viral, chest hurts from cough. Up all night. Using Mucinex and flonase. Dtr + flu, Son just seen and tested for covid and flu, chest x-ray. Pt has no fever. Asking for relief from cough/congestion. Please advise.

## 2024-04-02 NOTE — TELEPHONE ENCOUNTER
Patient's daughter had flu last week.  Patient started w/ symptoms 3/26/24.    She did go to urgent care 3/31 - was told lungs were clear and to use mucinex.    Patient reports she is getting no relief.    Her cough and congestion are 'next level'.    Any other suggestions for patient?    Pls call back.

## 2024-04-03 DIAGNOSIS — R05.9 COUGH, UNSPECIFIED TYPE: Primary | ICD-10-CM

## 2024-04-04 RX ORDER — CODEINE PHOSPHATE/GUAIFENESIN 10-100MG/5
5 LIQUID (ML) ORAL 3 TIMES DAILY PRN
Qty: 5 ML | Refills: 0 | Status: SHIPPED | OUTPATIENT
Start: 2024-04-04

## 2024-04-05 ENCOUNTER — TELEPHONE (OUTPATIENT)
Dept: FAMILY MEDICINE CLINIC | Facility: HOSPITAL | Age: 39
End: 2024-04-05

## 2024-04-05 DIAGNOSIS — J06.9 VIRAL UPPER RESPIRATORY TRACT INFECTION: Primary | ICD-10-CM

## 2024-04-05 RX ORDER — PREDNISONE 10 MG/1
TABLET ORAL DAILY
Qty: 18 TABLET | Refills: 0 | Status: SHIPPED | OUTPATIENT
Start: 2024-04-05 | End: 2024-04-14

## 2024-04-05 NOTE — TELEPHONE ENCOUNTER
VM----Pt just prescribed Prednisone and is currently taking Terbinafine for fungal infection. Asking if there are any concerns of taking the two together. Please advise

## 2024-04-05 NOTE — TELEPHONE ENCOUNTER
Patient called today    Her sinuses are draining/ green and yellow/  burning.    Can something be called in for this?    Please call to advise    She is aware of the cough med that was called in for her

## 2024-04-08 ENCOUNTER — TELEPHONE (OUTPATIENT)
Dept: FAMILY MEDICINE CLINIC | Facility: HOSPITAL | Age: 39
End: 2024-04-08

## 2024-04-08 DIAGNOSIS — J06.9 VIRAL UPPER RESPIRATORY TRACT INFECTION: Primary | ICD-10-CM

## 2024-04-08 RX ORDER — CEFUROXIME AXETIL 500 MG/1
500 TABLET ORAL EVERY 12 HOURS SCHEDULED
Qty: 14 TABLET | Refills: 0 | Status: SHIPPED | OUTPATIENT
Start: 2024-04-08 | End: 2024-04-15

## 2024-04-08 NOTE — TELEPHONE ENCOUNTER
I had spoke with somebody a couple times last week about my whole family being sick. I was put on Prednisone. It helped a little bit, but I still cannot get rid of all this bright green and yellow snot. I don't know where it's all coming from, but there's a lot of it. Nothing is drying any of it up. They had told me to call back Monday morning to get an antibiotic, so that is what I'm doing. If you could just give me a call back, [phone], my seat [facility], [facility], if that helps at all too. Thank you. Bye.

## 2024-05-13 ENCOUNTER — TELEPHONE (OUTPATIENT)
Age: 39
End: 2024-05-13

## 2024-05-13 NOTE — TELEPHONE ENCOUNTER
Patient is requesting an insurance referral for the following specialty:      Test Name / Order Name: Black spot in eye    DX Code: N/A    Date Of Service: May 14th 2024 @ 1:15pm    Location/Facility Name/Address/Phone #:   [facility]  [address] [place] [address]   [phone]    Location / Facility NPI: [id number]    [facility] Phone # To Reach The Patient:   [phone]

## 2024-08-02 DIAGNOSIS — G43.009 MIGRAINE WITHOUT AURA AND WITHOUT STATUS MIGRAINOSUS, NOT INTRACTABLE: ICD-10-CM

## 2024-08-02 RX ORDER — BUTALBITAL, ACETAMINOPHEN AND CAFFEINE 50; 325; 40 MG/1; MG/1; MG/1
TABLET ORAL
Qty: 30 TABLET | Refills: 0 | Status: SHIPPED | OUTPATIENT
Start: 2024-08-02

## 2024-09-04 LAB
ALBUMIN SERPL-MCNC: 4.8 G/DL (ref 3.9–4.9)
ALP SERPL-CCNC: 59 IU/L (ref 44–121)
ALT SERPL-CCNC: 54 IU/L (ref 0–32)
AST SERPL-CCNC: 36 IU/L (ref 0–40)
BASOPHILS # BLD AUTO: 0 X10E3/UL (ref 0–0.2)
BASOPHILS NFR BLD AUTO: 1 %
BILIRUB SERPL-MCNC: 0.3 MG/DL (ref 0–1.2)
BUN SERPL-MCNC: 15 MG/DL (ref 6–20)
BUN/CREAT SERPL: 19 (ref 9–23)
CALCIUM SERPL-MCNC: 9.7 MG/DL (ref 8.7–10.2)
CHLORIDE SERPL-SCNC: 101 MMOL/L (ref 96–106)
CHOLEST SERPL-MCNC: 273 MG/DL (ref 100–199)
CHOLEST/HDLC SERPL: 2.5 RATIO (ref 0–4.4)
CO2 SERPL-SCNC: 23 MMOL/L (ref 20–29)
CREAT SERPL-MCNC: 0.78 MG/DL (ref 0.57–1)
EGFR: 100 ML/MIN/1.73
EOSINOPHIL # BLD AUTO: 0.1 X10E3/UL (ref 0–0.4)
EOSINOPHIL NFR BLD AUTO: 1 %
ERYTHROCYTE [DISTWIDTH] IN BLOOD BY AUTOMATED COUNT: 12.1 % (ref 11.7–15.4)
GLOBULIN SER-MCNC: 2.4 G/DL (ref 1.5–4.5)
GLUCOSE SERPL-MCNC: 101 MG/DL (ref 70–99)
HCT VFR BLD AUTO: 41.3 % (ref 34–46.6)
HCV AB S/CO SERPL IA: NON REACTIVE
HDLC SERPL-MCNC: 109 MG/DL
HGB BLD-MCNC: 13.5 G/DL (ref 11.1–15.9)
IMM GRANULOCYTES # BLD: 0 X10E3/UL (ref 0–0.1)
IMM GRANULOCYTES NFR BLD: 0 %
LDL CALC COMMENT: ABNORMAL
LDLC SERPL CALC-MCNC: 150 MG/DL (ref 0–99)
LDLC SERPL DIRECT ASSAY-MCNC: 152 MG/DL (ref 0–99)
LYMPHOCYTES # BLD AUTO: 2.2 X10E3/UL (ref 0.7–3.1)
LYMPHOCYTES NFR BLD AUTO: 40 %
MCH RBC QN AUTO: 28.7 PG (ref 26.6–33)
MCHC RBC AUTO-ENTMCNC: 32.7 G/DL (ref 31.5–35.7)
MCV RBC AUTO: 88 FL (ref 79–97)
MONOCYTES # BLD AUTO: 0.4 X10E3/UL (ref 0.1–0.9)
MONOCYTES NFR BLD AUTO: 7 %
NEUTROPHILS # BLD AUTO: 2.9 X10E3/UL (ref 1.4–7)
NEUTROPHILS NFR BLD AUTO: 51 %
PLATELET # BLD AUTO: 286 X10E3/UL (ref 150–450)
POTASSIUM SERPL-SCNC: 4.2 MMOL/L (ref 3.5–5.2)
PROT SERPL-MCNC: 7.2 G/DL (ref 6–8.5)
RBC # BLD AUTO: 4.71 X10E6/UL (ref 3.77–5.28)
SL AMB VLDL CHOLESTEROL CALC: 14 MG/DL (ref 5–40)
SODIUM SERPL-SCNC: 140 MMOL/L (ref 134–144)
TRIGL SERPL-MCNC: 87 MG/DL (ref 0–149)
TSH SERPL DL<=0.005 MIU/L-ACNC: 1.87 UIU/ML (ref 0.45–4.5)
WBC # BLD AUTO: 5.7 X10E3/UL (ref 3.4–10.8)

## 2024-09-10 ENCOUNTER — OFFICE VISIT (OUTPATIENT)
Dept: FAMILY MEDICINE CLINIC | Facility: HOSPITAL | Age: 39
End: 2024-09-10
Payer: COMMERCIAL

## 2024-09-10 VITALS
DIASTOLIC BLOOD PRESSURE: 76 MMHG | BODY MASS INDEX: 29.26 KG/M2 | SYSTOLIC BLOOD PRESSURE: 110 MMHG | WEIGHT: 159 LBS | OXYGEN SATURATION: 99 % | HEIGHT: 62 IN | HEART RATE: 60 BPM

## 2024-09-10 DIAGNOSIS — Z83.438 FAMILY HISTORY OF COMBINED HYPERLIPIDEMIA: ICD-10-CM

## 2024-09-10 DIAGNOSIS — R73.01 IFG (IMPAIRED FASTING GLUCOSE): ICD-10-CM

## 2024-09-10 DIAGNOSIS — Z00.00 ROUTINE ADULT HEALTH MAINTENANCE: Primary | ICD-10-CM

## 2024-09-10 DIAGNOSIS — G43.009 MIGRAINE WITHOUT AURA AND WITHOUT STATUS MIGRAINOSUS, NOT INTRACTABLE: ICD-10-CM

## 2024-09-10 DIAGNOSIS — E78.9 BORDERLINE HIGH CHOLESTEROL: ICD-10-CM

## 2024-09-10 PROCEDURE — 99395 PREV VISIT EST AGE 18-39: CPT | Performed by: STUDENT IN AN ORGANIZED HEALTH CARE EDUCATION/TRAINING PROGRAM

## 2024-09-10 NOTE — PROGRESS NOTES
Adult Annual Physical  Name: Anali Heard      : 1985      MRN: [id number]  Encounter Provider: Ekaterina Loaiza DO  Encounter Date: 9/10/2024   Encounter department: [facility] [address]  Assessment & Plan  Routine adult health maintenance  BP looks great. HR normal.   Routine care reviewed.   Recieved pap results. Neg HPV, NIL in 12/15/2021- UTD       Migraine without aura and without status migrainosus, not intractable  Rare use fioricet, refill last month is enough for now.        Borderline high cholesterol  Rec'd Helix testing for possible genetic culprit to know & teach kids about if +  Red Yeast Rice   Omega 3's - 2000 mg per day   Avoiding statins for now. Knows it is inevitable in the future though.   Orders:    Lipid Panel with Direct LDL reflex; Future    High sensitivity CRP; Future    Lipid Panel with Direct LDL reflex    High sensitivity CRP    IFG (impaired fasting glucose)  Labs ordered.   Orders:    Hemoglobin A1C; Future    Comprehensive metabolic panel; Future    Hemoglobin A1C    Comprehensive metabolic panel    Family history of combined hyperlipidemia    Orders:    Lipid Panel with Direct LDL reflex; Future    High sensitivity CRP; Future    Lipid Panel with Direct LDL reflex    High sensitivity CRP    Immunizations and preventive care screenings were discussed with patient today. Appropriate education was printed on patient's after visit summary.    Counseling:  Alcohol/drug use: discussed moderation in alcohol intake, the recommendations for healthy alcohol use, and avoidance of illicit drug use.  Dental Health: discussed importance of regular tooth brushing, flossing, and dental visits.  Injury prevention: discussed safety/seat belts, safety helmets, smoke detectors, carbon dioxide detectors, and smoking near bedding or upholstery.  Sexual health: discussed sexually transmitted diseases, partner selection, use of condoms, avoidance of unintended pregnancy,  and contraceptive alternatives.  Exercise: the importance of regular exercise/physical activity was discussed. Recommend exercise 3-5 times per week for at least 30 minutes.   Return in about 6 months (around 3/10/2025) for F/U Chronic DX.      Depression Screening and Follow-up Plan: Clincally patient does not have depression. No treatment is required.     Return in about 6 months (around 3/10/2025) for F/U Chronic DX.    History of Present Illness     Adult Annual Physical:  Patient presents for annual physical. Not sick recently, some runny nose due to allergies.     Diet and Physical Activity:  - Diet/Nutrition: consuming 3-5 servings of fruits/vegetables daily and portion control. good water intake, no food restrictions  - Exercise: strength training exercises, walking and 5-7 times a week on average.    General Health:  - Sleep: sleeps well and 7-8 hours of sleep on average. Calm Magnesium Glycinate  - Hearing: normal hearing bilateral ears.  - Vision: wears glasses and goes for regular eye exams.  - Dental: regular dental visits and brushes teeth twice daily.    /GYN Health:  - Follows with GYN: yes.   - Last menstrual cycle: 8/26/2024.   - History of STDs: no  - Contraception: male partner had vasectomy. [facility]      Review of Systems   Constitutional:  Negative for chills and fever.   HENT:  Positive for rhinorrhea and sore throat. Negative for sneezing.    Eyes:  Negative for pain, redness and itching.   Respiratory:  Negative for cough and shortness of breath.    Cardiovascular:  Negative for chest pain and palpitations.   Gastrointestinal:  Negative for constipation and diarrhea.   Genitourinary:  Negative for dysuria and urgency.   Neurological:  Positive for headaches (once a month or so). Negative for dizziness and light-headedness.   Psychiatric/Behavioral:  Negative for dysphoric mood, self-injury, sleep disturbance and suicidal ideas. The patient is not nervous/anxious.        Current  "Outpatient Medications on File Prior to Visit   Medication Sig Dispense Refill    butalbital-acetaminophen-caffeine (FIORICET,ESGIC) -40 mg per tablet TAKE 1 TABLET BY MOUTH EVERY 4 HOURS AS NEEDED FOR HEADACHES 30 tablet 0    Multiple Vitamins-Calcium (ONE-A-DAY WOMENS FORMULA PO) Take by mouth daily       Omega-3 Fatty Acids (Fish Oil) 300 MG CAPS Take by mouth      terbinafine (LamISIL) 250 mg tablet Take 250 mg by mouth daily      ipratropium (ATROVENT) 0.06 % nasal spray  (Patient not taking: Reported on 9/10/2024)       No current facility-administered medications on file prior to visit.      Social History     Tobacco Use    Smoking status: Former    Smokeless tobacco: Never    Tobacco comments:     quit age 16    Vaping Use    Vaping status: Never Used   Substance and Sexual Activity    Alcohol use: Yes     Alcohol/week: 1.0 standard drink of alcohol     Types: 1 Glasses of wine per week     Comment: wine    Drug use: No    Sexual activity: Yes     Partners: Male     Objective     /76   Pulse 60   Ht 5' 2\" (1.575 m)   Wt 72.1 kg (159 lb)   SpO2 99%   BMI 29.08 kg/m²     Physical Exam  Vitals reviewed.   Constitutional:       General: She is not in acute distress.     Appearance: Normal appearance. She is normal weight. She is not ill-appearing.   HENT:      Head: Normocephalic and atraumatic.      Right Ear: Tympanic membrane, ear canal and external ear normal. There is no impacted cerumen.      Left Ear: Tympanic membrane, ear canal and external ear normal. There is no impacted cerumen.      Nose: Nose normal. No congestion or rhinorrhea.      Mouth/Throat:      Mouth: Mucous membranes are moist.      Pharynx: Oropharynx is clear. No oropharyngeal exudate or posterior oropharyngeal erythema.   Eyes:      General: No scleral icterus.        Right eye: No discharge.         Left eye: No discharge.      Conjunctiva/sclera: Conjunctivae normal.   Cardiovascular:      Rate and Rhythm: Normal " rate and regular rhythm.      Pulses: Normal pulses.      Heart sounds: Normal heart sounds. No murmur heard.     No friction rub. No gallop.   Pulmonary:      Effort: Pulmonary effort is normal. No respiratory distress.      Breath sounds: Normal breath sounds. No stridor. No wheezing.   Abdominal:      General: Abdomen is flat. Bowel sounds are normal. There is no distension.      Palpations: Abdomen is soft. There is no mass.      Tenderness: There is no abdominal tenderness.   Musculoskeletal:         General: No swelling or tenderness. Normal range of motion.      Cervical back: Normal range of motion and neck supple. No rigidity.      Right lower leg: No edema.      Left lower leg: No edema.   Lymphadenopathy:      Cervical: No cervical adenopathy.   Skin:     General: Skin is warm and dry.      Capillary Refill: Capillary refill takes less than 2 seconds.      Coloration: Skin is not jaundiced or pale.   Neurological:      Mental Status: She is alert and oriented to person, place, and time.   Psychiatric:         Mood and Affect: Mood normal.         Behavior: Behavior normal.         Thought Content: Thought content normal.         Judgment: Judgment normal.

## 2024-09-10 NOTE — PATIENT INSTRUCTIONS
Red Yeast Rice   Omega 3's - 2000 mg per day   ? Citrus Bergamot - cholesterol     [facility] Testing -

## 2024-09-16 ENCOUNTER — TELEPHONE (OUTPATIENT)
Dept: ADMINISTRATIVE | Facility: OTHER | Age: 39
End: 2024-09-16

## 2024-09-16 NOTE — LETTER
Procedure Request Form: Cervical Cancer Screening      Date Requested: 24  Patient: Anali Heard  Patient : 1985   Referring Provider: Ekaterina Loaiza, DO        Date of Procedure ______________________________       The above patient has informed us that they have completed their   most recent Cervical Cancer Screening at your facility. Please complete   this form and attach all corresponding procedure reports/results.    Comments __________________________________________________________  ____________________________________________________________________  ____________________________________________________________________  ____________________________________________________________________    Facility Completing Procedure _________________________________________    Form Completed By (print name) _______________________________________      Signature __________________________________________________________      These reports are needed for  compliance.    Please fax this completed form and a copy of the procedure report to our office located at [address] [place] [address] as soon as possible to Fax [phone] marvin Marin: Phone [phone]    We thank you for your assistance in treating our mutual patient.

## 2024-09-16 NOTE — TELEPHONE ENCOUNTER
----- Message from Ekaterina Loaiza DO sent at 9/13/2024  1:36 AM EDT -----  Regarding: PAP  09/13/24 1:37 AM    Hello, our patient Anali Heard has had Pap Smear (HPV) aka Cervical Cancer Screening completed/performed. Please assist in updating the patient chart by pulling the document from the Media Tab. The date of service is 12/16/21    Thank you,  Ekaterina Loaiza DO  [facility] [address]

## 2024-09-16 NOTE — TELEPHONE ENCOUNTER
Upon review of the In Basket request and the patient's chart, initial outreach has been made via fax to facility. Please see Contacts section for details.     Thank you  Tania Mcgee MA

## 2024-09-17 NOTE — TELEPHONE ENCOUNTER
Upon review of the In Basket request we were able to locate, review, and update the patient chart as requested for Pap Smear (HPV) aka Cervical Cancer Screening.    Any additional questions or concerns should be emailed to the Practice Liaisons via the appropriate education email address, please do not reply via In Basket.    Thank you  Tania Mcgee MA   [facility]

## 2024-09-20 ENCOUNTER — TELEPHONE (OUTPATIENT)
Age: 39
End: 2024-09-20

## 2024-09-20 NOTE — TELEPHONE ENCOUNTER
Patient is requesting an insurance referral for the following specialty:      Test Name / Order Name:     DX Code:     Date Of Service: 9/24    Location/Facility Name/Address/Phone #:   [facility]  [address] [place] [address]  [place] [address]  Phone: [phone]    Location / Facility NPI: [id number]    Best Phone # To Reach The Patient: [phone]

## 2024-10-07 DIAGNOSIS — R63.5 WEIGHT GAIN: ICD-10-CM

## 2024-10-07 DIAGNOSIS — R73.01 IFG (IMPAIRED FASTING GLUCOSE): Primary | ICD-10-CM

## 2024-10-07 DIAGNOSIS — E78.9 BORDERLINE HIGH CHOLESTEROL: ICD-10-CM

## 2025-01-30 ENCOUNTER — OFFICE VISIT (OUTPATIENT)
Age: 40
End: 2025-01-30

## 2025-01-30 VITALS
WEIGHT: 158.6 LBS | HEIGHT: 63 IN | DIASTOLIC BLOOD PRESSURE: 60 MMHG | HEART RATE: 71 BPM | TEMPERATURE: 97.6 F | BODY MASS INDEX: 28.1 KG/M2 | SYSTOLIC BLOOD PRESSURE: 90 MMHG

## 2025-01-30 DIAGNOSIS — E66.3 OVERWEIGHT: Primary | ICD-10-CM

## 2025-01-30 DIAGNOSIS — R63.5 WEIGHT GAIN: ICD-10-CM

## 2025-01-30 DIAGNOSIS — R73.01 IFG (IMPAIRED FASTING GLUCOSE): ICD-10-CM

## 2025-01-30 DIAGNOSIS — E78.9 BORDERLINE HIGH CHOLESTEROL: ICD-10-CM

## 2025-01-30 PROCEDURE — 99213 OFFICE O/P EST LOW 20 MIN: CPT | Performed by: PHYSICIAN ASSISTANT

## 2025-01-30 RX ORDER — BUPROPION HYDROCHLORIDE 150 MG/1
150 TABLET ORAL DAILY
Qty: 30 TABLET | Refills: 3 | Status: SHIPPED | OUTPATIENT
Start: 2025-01-30

## 2025-01-30 NOTE — PATIENT INSTRUCTIONS
Start Wellbutrin  mg daily in the morning and in 2 weeks, as long as you are tolerating Wellbutrin well, start Naltrexone.     Potential side effects of Wellbutrin include: abdominal upset, headache, dizziness, trouble sleeping, increased blood pressure, depression/anxiety, and fatigue. Patient should call/return if he/she develops symptoms of depression/aniety. Patient should have ER evaluation if thoughts of harming self/others occur. Wellbutrin should be weaned rather than stopped abruptly.

## 2025-01-30 NOTE — PROGRESS NOTES
Assessment/Plan:  Anali was seen today for consult.    Diagnoses and all orders for this visit:    Overweight  -     buPROPion (Wellbutrin XL) 150 mg 24 hr tablet; Take 1 tablet (150 mg total) by mouth daily    IFG (impaired fasting glucose)  -     Ambulatory Referral to Weight Management  -     Insulin, fasting; Future  -     buPROPion (Wellbutrin XL) 150 mg 24 hr tablet; Take 1 tablet (150 mg total) by mouth daily    Borderline high cholesterol  -     Ambulatory Referral to Weight Management    Weight gain  -     Ambulatory Referral to Weight Management         - Discussed options of HealthyCORE-Intensive Lifestyle Intervention Program, Very Low Calorie Diet-VLCD, and Conservative Program and the role of weight loss medications.  - Explained the importance of making lifestyle changes first before starting anti-obesity medications.  - Patient should demonstrate lifestyle changes first before anti-obesity medication initiated.   - Patient is interested in pursuing Conservative Program  - Initial weight loss goal of 5-10% weight loss for improved health as studies have shown this is where we see the greatest impact on improving health and decreasing risk of obesity related conditions.  - Weight loss can improve patient's co-morbid conditions and/or prevent weight-related complications.  - Labs reviewed: As below.      General Recommendations:  Nutrition:  Eat breakfast daily.  Do not skip meals.     Food log (ie.) www.[employer].com, sparkpeople.com, loseit.com, calorieking.com, etc.    Practice mindful eating.  Be sure to set aside time to eat, eat slowly, and savor your food.    Hydration:    At least 64oz of water daily.  No sugar sweetened beverages.  No juice (eat the fruit instead).    Exercise:  Studies have shown that the ideal exercise goal is somewhere between 150 to 300 minutes of moderate intensity exercise a week.  Start with exercising 10 minutes every other day and gradually increase physical  activity with a goal of at least 150 minutes of moderate intensity exercise a week, divided over at least 3 days a week.  An example of this would be exercising 30 minutes a day, 5 days a week.  Resistance training can increase muscle mass and increase our resting metabolic rate.   FULL BODY resistance training is recommended 2-3 times a week.  Do not do this on consecutive days to allow for muscle recovery.    Aim for a bare minimum 5000 steps, even on days you do not exercise.    Monitoring:   Weigh yourself daily.  If this causes undue stress, then just weigh yourself once a week.  Weigh yourself the same time of the day with the same amount of clothing on.  Preferably this should be done after waking up, before you eat, and with no clothing or minimal clothing on.    Calorie goal:  1442-1541 ximena/day (Provided with meal plan to follow).    Return visit:    Calorie tracking/deficit - RD menu planning  Physical activity goals  AOM  Contraception:  had a vasectomy   No AOM contraindications  Trial of wellbutrin. Use and side effect profile reviewed  RTC: 4 months       Total time spent reviewing chart, interviewing patient, examining patient, discussing plan, answering all questions, and documentin min.       ______________________________________________________________________        Subjective:   Chief Complaint   Patient presents with    Consult     Mwm consult       HPI: Anali Heard  is a 39 y.o. female with history of migraines,  and excess weight, here to pursue weight loss management.  Previous notes and records have been reviewed.    TSH WNL  Fasting glucose 101    Patient reports an issue for the past 7 years since having her second kid  Prepregnancy 137 lbs  Went back down to 137 lbs   Then 1 year thereafter began slowly gaining weight.     Dietary changes - skip breakfast, increase protein  Exercise - walking + resistance training    Does feel like she stress/emotional eats    GOAL: 140-145  lbs     HPI  Wt Readings from Last 20 Encounters:   01/30/25 71.9 kg (158 lb 9.6 oz)   09/10/24 72.1 kg (159 lb)   03/31/24 67.2 kg (148 lb 3.2 oz)   03/28/24 66.7 kg (147 lb)   12/17/23 68 kg (150 lb)   12/06/23 68.9 kg (152 lb)   09/14/23 66.2 kg (146 lb)   12/08/22 66.2 kg (146 lb)   12/05/22 66.2 kg (146 lb)   09/15/22 64.9 kg (143 lb)   08/31/22 64.9 kg (143 lb)   07/28/22 64.9 kg (143 lb)   06/16/22 64.9 kg (143 lb)   03/31/22 64.9 kg (143 lb)   12/01/21 64 kg (141 lb)   09/20/21 65.8 kg (145 lb)   03/25/21 67.6 kg (149 lb)   03/04/21 67.6 kg (149 lb)   11/09/20 65.8 kg (145 lb)   09/16/20 63.5 kg (140 lb)       Food logging:  B: SKIP or protein shake  S:  L: yogurt  S: pretzels   D: protein (deer, chicken) + vegetable (broccoli, greenbeans) +starch (minimal potato, corn)  S: skip       Hydration: water         Coffee 1-2 cups + SF syrup + collagen   Occassional diet soda  Alcohol: 0-3/4 / week      Exercise: Walking 10,000 day    Resistance  - at home TV 30 minutes, 5-8lbs. 2-3x/week     Sleep:  Better since taking magnesium. [employer] 6-7 hours   Occupation: Works at [employer]       Past Medical History:   Diagnosis Date    Acquired hallux valgus of right foot     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    Migraines     Wears glasses      Patient denies personal and family history of  pancreatitis, thyroid cancer, MEN-2 tumors.  Denies any hx of glaucoma, seizures, kidney stones, gallstones.  Denies Hx of CAD, PAD, palpitations, arrhythmia.   Denies uncontrolled anxiety or depression, suicidal behavior or thinking , insomnia or sleep disturbance.   Past Surgical History:   Procedure Laterality Date    BREAST SURGERY  11/17/22    FOOT OSTEOTOMY Left 02/17/2017    Procedure: OSTECTOMY 1ST METATARSAL;  Surgeon: Gunner Thomas DPM;  Location: [facility] OR;  Service:     FOOT SURGERY      RI ARTHRODESIS MIDTARSOMETATARSAL SINGLE JOINT Right 06/12/2020    Procedure: ARTHRODESIS / FUSION 1ST  TARSOMETATARSAL JOINT;  Surgeon: Gunner Thomas DPM;  [facility];  Service: Podiatry    WI CORRJ HLX VLGS BNCTY SESMDC RESCJ PROX PHLX BASE Right 06/12/2020    Procedure: BUNIONECTOMY CHRISTIAN;  Surgeon: Gunner Thomas DPM;  [facility];  Service: Podiatry    WI ELECTRICAL STIMULATION BONE HEALING NONINVASIVE Right 06/12/2020    Procedure: INSERTION BONE STIMULATOR;  Surgeon: Gunner Thomas DPM;  [facility];  Service: Podiatry    WI GLOSSECTOMY <ONE-HALF TONGUE Right 03/04/2020    Procedure: RIGHT PARTIAL GLOSSECTOMY W LASER;  Surgeon: Adam Crowley MD;  [facility];  Service: ENT    WI GLOSSECTOMY <ONE-HALF TONGUE Right 08/31/2022    Procedure: RIGHT PARTIAL GLOSSECTOMY, KTP LASER FROZEN SECTIONS;  Surgeon: Adam Crowley MD;  [facility];  Service: ENT    TOE OSTEOTOMY Left 02/17/2017    Procedure: CLOSING BASE WEDGE OSTEOTOMY 1ST METATARSAL: APPLICATION OF SHORT LEG SPLINT;APPLICTION AND ACTIVATION OF BONE STIMULATOR;  Surgeon: Gunner Thomas DPM;  Location: [facility];  Service:     VAGINAL DELIVERY      X 1    WISDOM TOOTH EXTRACTION      X 4     The following portions of the patient's history were reviewed and updated as appropriate: allergies, current medications, past family history, past medical history, past social history, past surgical history, and problem list.    Review Of Systems:  Review of Systems   Constitutional:  Negative for fatigue and fever.   HENT:  Negative for sore throat, trouble swallowing and voice change.    Respiratory:  Negative for shortness of breath.    Cardiovascular:  Negative for chest pain.   Gastrointestinal:  Negative for abdominal pain, constipation, diarrhea, nausea and vomiting.   Endocrine: Negative for cold intolerance and heat intolerance.   Genitourinary:  Negative for difficulty urinating.   Musculoskeletal:  Negative for arthralgias and back pain.   Psychiatric/Behavioral:  Negative for suicidal ideas. The patient is not nervous/anxious.   "  All other systems reviewed and are negative.      Objective:  BP 90/60 (Patient Position: Sitting, Cuff Size: Standard)   Pulse 71   Temp 97.6 °F (36.4 °C) (Tympanic)   Ht 5' 3.35\" (1.609 m)   Wt 71.9 kg (158 lb 9.6 oz)   BMI 27.79 kg/m²   Physical Exam  Vitals and nursing note reviewed.   Constitutional:       General: She is not in acute distress.     Appearance: Normal appearance. She is obese. She is not ill-appearing, toxic-appearing or diaphoretic.   HENT:      Head: Normocephalic and atraumatic.   Eyes:      General:         Right eye: No discharge.         Left eye: No discharge.      Conjunctiva/sclera: Conjunctivae normal.   Pulmonary:      Effort: Pulmonary effort is normal. No respiratory distress.   Musculoskeletal:         General: Normal range of motion.      Cervical back: Normal range of motion. No rigidity.      Right lower leg: No edema.      Left lower leg: No edema.   Skin:     Coloration: Skin is not pale.      Findings: No erythema or rash.   Neurological:      General: No focal deficit present.      Mental Status: She is alert.   Psychiatric:         Mood and Affect: Mood normal.         Labs and Imaging  Recent labs and imaging have been personally reviewed.  Lab Results   Component Value Date    WBC 5.7 09/03/2024    HGB 13.5 09/03/2024    HCT 41.3 09/03/2024    MCV 88 09/03/2024     09/03/2024     Lab Results   Component Value Date    SODIUM 140 09/03/2024    K 4.2 09/03/2024     09/03/2024    CO2 23 09/03/2024    BUN 15 09/03/2024    CREATININE 0.78 09/03/2024    GLUC 101 (H) 09/03/2024    AST 36 09/03/2024    ALT 54 (H) 09/03/2024    TP 7.2 09/03/2024    TBILI 0.3 09/03/2024    EGFR 100 09/03/2024     No results found for: \"HGBA1C\"  Lab Results   Component Value Date    TSH 1.870 09/03/2024     Lab Results   Component Value Date    CHOLESTEROL 273 (H) 09/03/2024     Lab Results   Component Value Date     09/03/2024     Lab Results   Component Value Date    TRIG " 87 09/03/2024     Lab Results   Component Value Date    LDLCALC 150 (H) 09/03/2024

## 2025-02-13 ENCOUNTER — PATIENT MESSAGE (OUTPATIENT)
Age: 40
End: 2025-02-13

## 2025-02-13 DIAGNOSIS — E66.3 OVERWEIGHT: Primary | ICD-10-CM

## 2025-02-18 ENCOUNTER — TELEPHONE (OUTPATIENT)
Age: 40
End: 2025-02-18

## 2025-02-18 RX ORDER — BUPROPION HYDROCHLORIDE 100 MG/1
100 TABLET, EXTENDED RELEASE ORAL 2 TIMES DAILY
Qty: 60 TABLET | Refills: 2 | Status: SHIPPED | OUTPATIENT
Start: 2025-02-18

## 2025-02-18 NOTE — TELEPHONE ENCOUNTER
Patient following up on a [employer] message that she sent on 2/13/25 regarding the Wellbutrin medication. Patient is requesting a call back to discuss [phone]. Thank you.

## 2025-02-18 NOTE — TELEPHONE ENCOUNTER
Neurology called received a referral sent to incorrect office.     Patient is a 75 y/o M--with a PMHx of hypothyroidism on Synthroid, essential HTN on Lopressor, aldactone, depression on Seroquel and Cymbalta, CAD with S/P cardiac catheterization by Dr. Daryl Patrick at [facility] in Nov 2019, type 2 DM on Januvia and bedtime Levemir, with the patient followed by his PCP above and apparently has had upper nasal congestion and loose BM for the past 3 weeks, with the son concerned with increased confusion and decreased ADL and exertional dyspnoea for the past 3 weeks, but worsening for the past few days, with the patient with an apparent outpatient lab assay with hyponatremia and was sent to the ER by his PCP, apparently with prior episodes of hyponatremia, under the presumption of the hyponatremia as the primary mechanism of the confusional state.  Patient had just received his booster dose of the COVID-10 Moderna 7 days ago.   No fever, no chills, no rigors.  NO sore throat but with upper nasal congestion as above.  NO neck pain.  No dyspnoea at present.  NO chest pain/pressure.  NO HA, no focal weakness.  No diaphoresis.    #Bradycardia   --Patient with bradycardia on tele   --At time of visit, patient was asymptomatic   --Continue to monitor on Tele and close monitoring of patient     #COVID-19  --CT head ordered--NO acute bleed or mass.     --Presently NOT requiring supplemental O2 to warrant Decadron.     --Patient is currently saturating 98% on room air  --Discussed with ID attending Dr. Cintron, patient is not in need of Dexamethasone   --Per ID, can continue with Remdesivir (day 3) for a total of 5 days (Started on 12/28) -- monitor renal function and LFTs   --Monitor inflammatory markers   --Continue with Symbicort 160  --Monitor patient clinically O2 saturation, vital signs and temperature curve       #Hyponatremia.   --CT head   --Continue with Fluid restriction; Monitor Na+ and daily weights.    --Renal evaluation, F/U Recommendations  --Started on Salt tab (Sodium chloride) per renal   --Continue to hold aldactone  --Cymbalta, and Seroquel can be resumed per nephrology; monitor QTC   --Monitor BMP daily -- Na noted to be 126 today 12/30; F/U nephrology recommendations   --Likely due to SIADH; Patient will be receiving Tolvaptan today, with holding of sodium chloride per nephrology   --F/U repeat Urine Na and Osmolality, monitor Serum Na on AM labs        #CAD (coronary artery disease).   --Upgraded to telemetry to exclude cardiac equivalent.  --Continue with Aspirin   --Continue with Simvastatin   --Cardio consult PRN   --Check ECHO -- EF of 65%       #Type 2 diabetes mellitus.   --Continue with ISS for now  --Patient was on bedtime Lantus at home  --Continue to monitor FS and serum glucose levels  --Avoid Hypo/Hyperglycemia  -- A1C -- 7.1  --Patient will need outpatient follow up for DM management and control   --Endocrinology consultation PRN       #Productive cough  --Patient reporting productive cough with white sputum  --Will check sputum culture-- Moderate polymorphonuclear leukocytes per low power field, Few Squamous epithelial cells per low power field, Numerous Gram Positive Cocci in Pairs and Chains seen per oil power field  --Awaiting throat culture results --Negative   --Tessalon perle for cough  --Cough syrup PRN for cough       #Sore throat  --Patient endorsing sore throat  --Will check Throat culture-- negative   --Tylenol for pain control   --Will hold off on Abx for now       #HTN  --Monitor Vital Signs and BP closely  --Continue with home medication Lopressor for now  --Aldactone is on hold as may cause HypoNa  --Monitor BP       #Loose bowels  --Per patient experienced loose bowel outpatient   --If occurs and of watery consistency check for C.Diff   --Likely due to COVID  --Blood culture X 2 with NGTD  --Urine culture with probable contamination and patient is currently asymptomatic  --Continue to monitor       #Depression  --Per nephrology can resume home medications-- Seroquel and Cymbalta that were previously on hold due to hyponatremia   --Continue to monitor patient and monitor QTC   --Psych consult PRN     #Hypothyroidism   --TSH on this admission-- WNL at 2.78  --Continue with home dose Synthroid     #Need for prophylactic measure.   -- DVT prophylaxis with Lovenox 40    --Patient will need outpatient follow up for CT Head findings

## 2025-02-18 NOTE — TELEPHONE ENCOUNTER
Patient called in regards to referral/billing issue.  She states 10/7 referral for weight management was listed as self pay.  [facility]'s billing department is unable to see referral even though in chart.  Patient would like to know if referral can be sent to billing department or does she need new referral.  Please contact patient.

## 2025-02-19 ENCOUNTER — TELEPHONE (OUTPATIENT)
Dept: BARIATRICS | Facility: CLINIC | Age: 40
End: 2025-02-19

## 2025-02-19 NOTE — TELEPHONE ENCOUNTER
Contacted pt regarding rescheduling cancelled appt on 2/27/25 at 1:30 pm. Pt had concerns about financials and wanted to push appt back further. Pt was scheduled 3/27/25 at 1:30 pm. Pt also had concern about taking medications, advised pt to wait for provider to message back through [employer] or call office.

## 2025-02-28 ENCOUNTER — TELEPHONE (OUTPATIENT)
Age: 40
End: 2025-02-28

## 2025-02-28 NOTE — TELEPHONE ENCOUNTER
Please complete ambulatory referral and send to Primary Care Procedure Prior Authorizations once complete so they can enter the insurance referral. Thank you!    Patient is requesting 3 insurance referrals for the following specialty:      Test Name / Order Name: office visit     DX Code: R68.89   12491    Date Of Service: 3/19    Location/Facility Name/Address/Phone #: [facility], [facility], [place] [address], [place], [id number] [phone]     Location / Facility NPI: [id number]    Best Phone # To Reach The Patient: [phone]      Patient is requesting an insurance referral for the following specialty:      Test Name / Order Name: office visit     DX Code: R68.89    [id number]    Date Of Service: 3/25    Location/Facility Name/Address/Phone #: [facility] [address] [place] [address]  [facility] [phone]    Location / Facility NPI: [id number]     Best Phone # To Reach The Patient:       Patient is requesting an insurance referral for the following specialty:      Test Name / Order Name: office visit    DX Code: R68.89     [id number]     Date Of Service: 3/24    Location/Facility Name/Address/Phone #: [facility] [address] [place] [address]   [phone]    Location / Facility NPI: [id number]    Best Phone # To Reach The Patient:

## 2025-03-04 DIAGNOSIS — K13.21 ORAL LEUKOPLAKIA: Primary | Chronic | ICD-10-CM

## 2025-03-04 DIAGNOSIS — L63.9 ALOPECIA AREATA: Primary | ICD-10-CM

## 2025-03-04 DIAGNOSIS — B35.3 TINEA PEDIS, UNSPECIFIED LATERALITY: Primary | ICD-10-CM

## 2025-03-09 LAB
ALBUMIN SERPL-MCNC: 4.6 G/DL (ref 3.9–4.9)
ALP SERPL-CCNC: 41 IU/L (ref 44–121)
ALT SERPL-CCNC: 20 IU/L (ref 0–32)
AST SERPL-CCNC: 24 IU/L (ref 0–40)
BILIRUB SERPL-MCNC: 0.3 MG/DL (ref 0–1.2)
BUN SERPL-MCNC: 18 MG/DL (ref 6–20)
BUN/CREAT SERPL: 21 (ref 9–23)
CALCIUM SERPL-MCNC: 9.5 MG/DL (ref 8.7–10.2)
CHLORIDE SERPL-SCNC: 104 MMOL/L (ref 96–106)
CHOLEST SERPL-MCNC: 241 MG/DL (ref 100–199)
CO2 SERPL-SCNC: 23 MMOL/L (ref 20–29)
CREAT SERPL-MCNC: 0.86 MG/DL (ref 0.57–1)
CRP SERPL HS-MCNC: 1.27 MG/L (ref 0–3)
EGFR: 88 ML/MIN/1.73
EST. AVERAGE GLUCOSE BLD GHB EST-MCNC: 108 MG/DL
GLOBULIN SER-MCNC: 2.4 G/DL (ref 1.5–4.5)
GLUCOSE SERPL-MCNC: 100 MG/DL (ref 70–99)
HBA1C MFR BLD: 5.4 % (ref 4.8–5.6)
HDLC SERPL-MCNC: 87 MG/DL
LDLC SERPL CALC-MCNC: 144 MG/DL (ref 0–99)
LDLC/HDLC SERPL: 1.7 RATIO (ref 0–3.2)
POTASSIUM SERPL-SCNC: 4.7 MMOL/L (ref 3.5–5.2)
PROT SERPL-MCNC: 7 G/DL (ref 6–8.5)
SL AMB VLDL CHOLESTEROL CALC: 10 MG/DL (ref 5–40)
SODIUM SERPL-SCNC: 140 MMOL/L (ref 134–144)
TRIGL SERPL-MCNC: 61 MG/DL (ref 0–149)

## 2025-03-11 ENCOUNTER — OFFICE VISIT (OUTPATIENT)
Dept: FAMILY MEDICINE CLINIC | Facility: HOSPITAL | Age: 40
End: 2025-03-11
Payer: COMMERCIAL

## 2025-03-11 VITALS
SYSTOLIC BLOOD PRESSURE: 120 MMHG | HEIGHT: 63 IN | DIASTOLIC BLOOD PRESSURE: 76 MMHG | WEIGHT: 156.6 LBS | HEART RATE: 77 BPM | BODY MASS INDEX: 27.75 KG/M2 | OXYGEN SATURATION: 99 %

## 2025-03-11 DIAGNOSIS — R63.5 WEIGHT GAIN: Primary | ICD-10-CM

## 2025-03-11 DIAGNOSIS — R73.01 IFG (IMPAIRED FASTING GLUCOSE): ICD-10-CM

## 2025-03-11 DIAGNOSIS — E78.9 BORDERLINE HIGH CHOLESTEROL: ICD-10-CM

## 2025-03-11 PROCEDURE — 99214 OFFICE O/P EST MOD 30 MIN: CPT | Performed by: STUDENT IN AN ORGANIZED HEALTH CARE EDUCATION/TRAINING PROGRAM

## 2025-03-11 RX ORDER — CLOBETASOL PROPIONATE 0.5 MG/ML
SOLUTION TOPICAL
COMMUNITY
Start: 2025-02-15

## 2025-03-11 NOTE — PROGRESS NOTES
Name: Anali Heard      : 1985      MRN: [id number]  Encounter Provider: Ekaterina Loaiza DO  Encounter Date: 3/11/2025   Encounter department: [facility] PRIMARY CARE [address]  :  Assessment & Plan  Weight gain  Family hx of thryoid conditions in mom & sister.   No abnormal thyroid findings on exam.   Will rule out concerns this could be new onset and contributing.   Orders:  •  TSH, 3rd generation; Future  •  T4, free; Future  •  T3, free; Future  •  Insulin and C-Peptide, Serum; Future  •  Cortisol; Future    IFG (impaired fasting glucose)  Will continue to monitor, rpt labs given. A1c 5.4 on most recent check.   Orders:  •  TSH, 3rd generation; Future  •  T4, free; Future  •  T3, free; Future  •  Insulin and C-Peptide, Serum; Future  •  Cortisol; Future    Borderline high cholesterol  LDL quite high as below, but her HDL is also very high at 87. Discussed statin use & indications.   Will also discuss with colleague given younger age and likely lifelong commitment to statins once on.   Could also try supplements - red yeast rice, coq10, niacin, etc.   Stay active, increase water.        Return in about 9 months (around 2025).       History of Present Illness   Chief Complaint   Patient presents with   • Follow-up   HPI  Not sick recently.     Just had labs done.        Dad has terrible cholesterol - had blockage in leg & needed bypass of artery in leg. No known MI's or Strokes or bypass or Stents.     Mom & sister have thyroid issue. Mom no MI.     A1c 5.4, CMP nml, except glu 100.  GFR 88, lytes nml.      Latest Reference Range & Units 25 07:25   Cholesterol 100 - 199 mg/dL 241 (H)   Triglycerides 0 - 149 mg/dL 61   HDL >39 mg/dL 87   LDL Calculated 0 - 99 mg/dL 144 (H)   LDL/HDL RATIO 0.0 - 3.2 ratio 1.7   VLDL Cholesterol Aiden 5 - 40 mg/dL 10   (H): Data is abnormally high    Working on weight loss, started wellbutrin.   Does eat venison somewhat often.   Lost weight after  "both kids, but now gaining without reason.   Used to be 137 now up 20 lbs.   Working out daily.     ENT f/u this month. Seen in Sept also.     Wt Readings from Last 3 Encounters:   03/19/25 70.3 kg (155 lb)   03/11/25 71 kg (156 lb 9.6 oz)   01/30/25 71.9 kg (158 lb 9.6 oz)     Temp Readings from Last 3 Encounters:   04/03/25 98.1 °F (36.7 °C)   01/30/25 97.6 °F (36.4 °C) (Tympanic)   03/31/24 98.8 °F (37.1 °C) (Temporal)     BP Readings from Last 3 Encounters:   04/03/25 138/89   03/11/25 120/76   01/30/25 90/60     Pulse Readings from Last 3 Encounters:   04/03/25 94   03/11/25 77   01/30/25 71     Review of Systems   Constitutional:  Negative for chills and fever.   Respiratory:  Negative for cough and shortness of breath.    Cardiovascular:  Negative for chest pain and palpitations.   Neurological:  Negative for light-headedness and headaches.     Objective   /76   Pulse 77   Ht 5' 3.35\" (1.609 m)   Wt 71 kg (156 lb 9.6 oz)   SpO2 99%   BMI 27.43 kg/m²      Physical Exam  Vitals and nursing note reviewed.   Constitutional:       General: She is not in acute distress.     Appearance: Normal appearance. She is well-developed, well-groomed and overweight. She is not ill-appearing.   HENT:      Head: Normocephalic and atraumatic.   Eyes:      General: No scleral icterus.        Right eye: No discharge.         Left eye: No discharge.      Conjunctiva/sclera: Conjunctivae normal.   Neck:      Comments: No thyroid nodules or thyromegaly.  Cardiovascular:      Rate and Rhythm: Normal rate and regular rhythm.      Pulses: Normal pulses.      Heart sounds: Normal heart sounds. No murmur heard.  Pulmonary:      Effort: Pulmonary effort is normal. No respiratory distress.      Breath sounds: Normal breath sounds.   Musculoskeletal:      Cervical back: Normal range of motion and neck supple. No rigidity or tenderness.      Right lower leg: No edema.      Left lower leg: No edema.   Lymphadenopathy:      Cervical: " No cervical adenopathy.   Skin:     General: Skin is warm and dry.      Capillary Refill: Capillary refill takes less than 2 seconds.   Neurological:      Mental Status: She is alert and oriented to person, place, and time.      Gait: Gait normal.   Psychiatric:         Mood and Affect: Mood normal.         Behavior: Behavior normal. Behavior is cooperative.         Thought Content: Thought content normal.         Judgment: Judgment normal.

## 2025-03-12 DIAGNOSIS — E66.3 OVERWEIGHT: ICD-10-CM

## 2025-03-13 RX ORDER — BUPROPION HYDROCHLORIDE 100 MG/1
100 TABLET, EXTENDED RELEASE ORAL 2 TIMES DAILY
Qty: 180 TABLET | Refills: 1 | Status: SHIPPED | OUTPATIENT
Start: 2025-03-13

## 2025-03-18 LAB
C PEPTIDE SERPL-MCNC: 1.6 NG/ML (ref 1.1–4.4)
CORTIS SERPL-MCNC: 6.8 UG/DL (ref 6.2–19.4)
INSULIN SERPL-ACNC: 3.9 UIU/ML (ref 2.6–24.9)
T3FREE SERPL-MCNC: 2.9 PG/ML (ref 2–4.4)
T4 FREE SERPL-MCNC: 1.13 NG/DL (ref 0.82–1.77)
TSH SERPL DL<=0.005 MIU/L-ACNC: 1.63 UIU/ML (ref 0.45–4.5)

## 2025-03-19 ENCOUNTER — OFFICE VISIT (OUTPATIENT)
Dept: PODIATRY | Facility: CLINIC | Age: 40
End: 2025-03-19
Payer: COMMERCIAL

## 2025-03-19 VITALS — WEIGHT: 155 LBS | HEIGHT: 63 IN | BODY MASS INDEX: 27.46 KG/M2

## 2025-03-19 DIAGNOSIS — B35.3 TINEA PEDIS, UNSPECIFIED LATERALITY: ICD-10-CM

## 2025-03-19 DIAGNOSIS — B35.1 ONYCHOMYCOSIS: Primary | ICD-10-CM

## 2025-03-19 PROCEDURE — 99203 OFFICE O/P NEW LOW 30 MIN: CPT | Performed by: PODIATRIST

## 2025-03-19 RX ORDER — TERBINAFINE HYDROCHLORIDE 250 MG/1
250 TABLET ORAL DAILY
Qty: 14 TABLET | Refills: 2 | Status: SHIPPED | OUTPATIENT
Start: 2025-03-19 | End: 2025-04-30

## 2025-03-19 NOTE — LETTER
March 19, 2025     Ekaterina Loaiza DO  [address] [place] [id number]    Patient: Anali Heard   YOB: 1985   Date of Visit: 3/19/2025       Dear Dr. Loaiza:    Thank you for referring Anali Heard to me for evaluation. Below are my notes for this consultation.    If you have questions, please do not hesitate to call me. I look forward to following your patient along with you.         Sincerely,        Gunner Thomas DPM        CC: No Recipients    Gunner Thomas DPM  3/19/2025  4:52 PM  Sign when Signing Visit                 PATIENT:  Anali Heard  1985       ASSESSMENT:     1. Onychomycosis  terbinafine (LamISIL) 250 mg tablet      2. Tinea pedis, unspecified laterality  Ambulatory Referral to Podiatry                PLAN:  1. Reviewed medical records.  Reviewed the note from PCP.  Patient was counseled and educated on the condition and the diagnosis.    2. The diagnosis, treatment options and prognosis were discussed with the patient.    3. She completed oral Lamisil.  She still has mild thickening of 2nd toenails.  Will keep her on pulse dose Lamisil.    4. Discussed possible risks and side effects.  Recent labs reviewed and LFT was WNL.  No alcohol while she is on the med.  Rx sent.    5. Instructed her to OTC topical agents daily.  6. Patient will return in 6 months for re-evaluation.       Imaging: I have personally reviewed pertinent films in PACS  Labs, pathology, and Other Studies: I have personally reviewed pertinent reports.        Subjective:       HPI  The patient was referred to my office for evaluation of fungus infection in her toes.  She started with thickening of left 2nd toenail a couple of years ago.  Then, it spread to right 2nd toe.  She recently finished oral antifungal agent.  The last dose was in December.  She tolerated the medication well at that time.  Still concerns about thickening of toenails.  Denied any swelling.  No associated numbness or  paresthesia.  No significant weakness or dysfunction.         The following portions of the patient's history were reviewed and updated as appropriate: allergies, current medications, past family history, past medical history, past social history, past surgical history and problem list.  All pertinent labs and images were reviewed.      Past Medical History  Past Medical History:   Diagnosis Date   • Acquired hallux valgus of right foot    • Bunion    • 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   • Migraines    • Wears glasses        Past Surgical History  Past Surgical History:   Procedure Laterality Date   • BREAST SURGERY  11/17/22   • BUNIONECTOMY     • FOOT OSTEOTOMY Left 02/17/2017    Procedure: OSTECTOMY 1ST METATARSAL;  Surgeon: Gunner Thomas DPM;  [facility];  Service:    • FOOT SURGERY     • AL ARTHRODESIS MIDTARSOMETATARSAL SINGLE JOINT Right 06/12/2020    Procedure: ARTHRODESIS / FUSION 1ST TARSOMETATARSAL JOINT;  Surgeon: Gunner Thomas DPM;  [facility];  Service: Podiatry   • AL CORRJ HLX VLGS BNCTY SESMDC RESCJ PROX PHLX BASE Right 06/12/2020    Procedure: BUNIONECTOMY CHRISTIAN;  Surgeon: Gunner Thomas DPM;  [facility];  Service: Podiatry   • AL ELECTRICAL STIMULATION BONE HEALING NONINVASIVE Right 06/12/2020    Procedure: INSERTION BONE STIMULATOR;  Surgeon: Gunner Thomas DPM;  [facility];  Service: Podiatry   • AL GLOSSECTOMY <ONE-HALF TONGUE Right 03/04/2020    Procedure: RIGHT PARTIAL GLOSSECTOMY W LASER;  Surgeon: Adam Crowley MD;  [facility];  Service: ENT   • AL GLOSSECTOMY <ONE-HALF TONGUE Right 08/31/2022    Procedure: RIGHT PARTIAL GLOSSECTOMY, KTP LASER FROZEN SECTIONS;  Surgeon: Adam Crowley MD;  [facility];  Service: ENT   • TOE OSTEOTOMY Left 02/17/2017    Procedure: CLOSING BASE WEDGE OSTEOTOMY 1ST METATARSAL: APPLICATION OF SHORT LEG SPLINT;APPLICTION AND ACTIVATION OF BONE STIMULATOR;  Surgeon: Gunner Thomas DPM;  Location[facility];  Service:    • VAGINAL DELIVERY      X 1   • WISDOM TOOTH EXTRACTION      X 4        Allergies:  Penicillins    Medications:  Current Outpatient Medications   Medication Sig Dispense Refill   • buPROPion (WELLBUTRIN SR) 100 mg 12 hr tablet TAKE 1 TABLET BY MOUTH TWICE A  tablet 1   • butalbital-acetaminophen-caffeine (FIORICET,ESGIC) -40 mg per tablet TAKE 1 TABLET BY MOUTH EVERY 4 HOURS AS NEEDED FOR HEADACHES 30 tablet 0   • clobetasol (TEMOVATE) 0.05 % external solution APPLY TWICE A DAY FOR 2 WEEKS, THEN STOP FOR 1 WEEK, REPEAT AS NEEDED     • Milk Thistle 1000 MG CAPS      • Multiple Vitamins-Calcium (ONE-A-DAY WOMENS FORMULA PO) Take by mouth daily      • Omega-3 Fatty Acids (Fish Oil) 300 MG CAPS Take by mouth     • terbinafine (LamISIL) 250 mg tablet Take 1 tablet (250 mg total) by mouth daily for 42 doses ( Take one a day for 2 weeks.  Then, repeat every 2 months) 14 tablet 2   • ipratropium (ATROVENT) 0.06 % nasal spray  (Patient not taking: Reported on 3/19/2025)       No current facility-administered medications for this visit.       Social History:  Social History     Socioeconomic History   • Marital status: /Civil Union     Spouse name: None   • Number of children: None   • Years of education: None   • Highest education level: None   Occupational History   • None   Tobacco Use   • Smoking status: Former     Passive exposure: Past   • Smokeless tobacco: Never   • Tobacco comments:     quit age 16    Vaping Use   • Vaping status: Never Used   Substance and Sexual Activity   • Alcohol use: Yes     Alcohol/week: 1.0 standard drink of alcohol     Types: 1 Glasses of wine per week     Comment: wine   • Drug use: No   • Sexual activity: Yes     Partners: Male   Other Topics Concern   • None   Social History Narrative    Always uses seat belt    Dental care, regularly    Employed    No advance directives    Occasional caffeine consumption    Recent change in lifestyle     "Supportive and safe    Lives with       Social Drivers of Health     Financial Resource Strain: Not on file   Food Insecurity: Not on file   Transportation Needs: No Transportation Needs (11/9/2020)    PRAPARE - Transportation    • Lack of Transportation (Medical): No    • Lack of Transportation (Non-Medical): No   Physical Activity: Sufficiently Active (11/9/2020)    Exercise Vital Sign    • Days of Exercise per Week: 3 days    • Minutes of Exercise per Session: 60 min   Stress: Stress Concern Present (11/9/2020)    [employer] - Occupational Stress Questionnaire    • Feeling of Stress : To some extent   Social Connections: Not on file   Intimate Partner Violence: Not At Risk (11/9/2020)    Humiliation, Afraid, Rape, and Kick questionnaire    • Fear of Current or Ex-Partner: No    • Emotionally Abused: No    • Physically Abused: No    • Sexually Abused: No   Housing Stability: Not on file          Review of Systems   Constitutional:  Negative for chills and fever.   Respiratory:  Negative for cough and shortness of breath.    Cardiovascular:  Negative for chest pain.   Gastrointestinal:  Negative for nausea and vomiting.   Musculoskeletal:  Negative for gait problem.   Skin:  Negative for wound.   Allergic/Immunologic: Negative for immunocompromised state.   Neurological:  Negative for weakness and numbness.   Hematological: Negative.    Psychiatric/Behavioral:  Negative for behavioral problems and confusion.          Objective:      Ht 5' 3.35\" (1.609 m) Comment: stated  Wt 70.3 kg (155 lb)   BMI 27.15 kg/m²          Physical Exam  Vitals reviewed.   Constitutional:       General: She is not in acute distress.     Appearance: She is not toxic-appearing or diaphoretic.   HENT:      Head: Normocephalic and atraumatic.   Eyes:      Extraocular Movements: Extraocular movements intact.   Cardiovascular:      Rate and Rhythm: Normal rate and regular rhythm.      Pulses: Normal " pulses.           Dorsalis pedis pulses are 2+ on the right side and 2+ on the left side.        Posterior tibial pulses are 2+ on the right side and 2+ on the left side.   Pulmonary:      Effort: Pulmonary effort is normal. No respiratory distress.   Musculoskeletal:         General: No swelling or signs of injury.      Cervical back: Normal range of motion and neck supple.      Right lower leg: No edema.      Left lower leg: No edema.      Right foot: No foot drop.      Left foot: No foot drop.   Skin:     General: Skin is warm.      Capillary Refill: Capillary refill takes less than 2 seconds.      Coloration: Skin is not cyanotic or mottled.      Findings: No abscess.      Nails: There is no clubbing.      Comments: Mild hypertrophy and discoloration of distal nails on 2nd toes bilaterally.  Mild keratosis at the tip of 2nd toes.  No clinical evidence of tinea pedis.     Neurological:      General: No focal deficit present.      Mental Status: She is alert and oriented to person, place, and time.      Cranial Nerves: No cranial nerve deficit.      Sensory: No sensory deficit.      Motor: No weakness.      Coordination: Coordination normal.   Psychiatric:         Mood and Affect: Mood normal.         Behavior: Behavior normal.         Thought Content: Thought content normal.         Judgment: Judgment normal.

## 2025-03-19 NOTE — PROGRESS NOTES
PATIENT:  Anali Heard  1985       ASSESSMENT:     1. Onychomycosis  terbinafine (LamISIL) 250 mg tablet      2. Tinea pedis, unspecified laterality  Ambulatory Referral to Podiatry                PLAN:  1. Reviewed medical records.  Reviewed the note from PCP.  Patient was counseled and educated on the condition and the diagnosis.    2. The diagnosis, treatment options and prognosis were discussed with the patient.    3. She completed oral Lamisil.  She still has mild thickening of 2nd toenails.  Will keep her on pulse dose Lamisil.    4. Discussed possible risks and side effects.  Recent labs reviewed and LFT was WNL.  No alcohol while she is on the med.  Rx sent.    5. Instructed her to OTC topical agents daily.  6. Patient will return in 6 months for re-evaluation.       Imaging: I have personally reviewed pertinent films in PACS  Labs, pathology, and Other Studies: I have personally reviewed pertinent reports.        Subjective:       HPI  The patient was referred to my office for evaluation of fungus infection in her toes.  She started with thickening of left 2nd toenail a couple of years ago.  Then, it spread to right 2nd toe.  She recently finished oral antifungal agent.  The last dose was in December.  She tolerated the medication well at that time.  Still concerns about thickening of toenails.  Denied any swelling.  No associated numbness or paresthesia.  No significant weakness or dysfunction.         The following portions of the patient's history were reviewed and updated as appropriate: allergies, current medications, past family history, past medical history, past social history, past surgical history and problem list.  All pertinent labs and images were reviewed.      Past Medical History  Past Medical History:   Diagnosis Date    Acquired hallux valgus of right foot     Bunion     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    Migraines     Wears glasses         Past Surgical History  Past Surgical History:   Procedure Laterality Date    BREAST SURGERY  11/17/22    BUNIONECTOMY      FOOT OSTEOTOMY Left 02/17/2017    Procedure: OSTECTOMY 1ST METATARSAL;  Surgeon: Gunner Thomas DPM;  [facility];  Service:     FOOT SURGERY      DC ARTHRODESIS MIDTARSOMETATARSAL SINGLE JOINT Right 06/12/2020    Procedure: ARTHRODESIS / FUSION 1ST TARSOMETATARSAL JOINT;  Surgeon: Gunner Thomas DPM;  [facility];  Service: Podiatry    DC CORRJ HLX VLGS BNCTY SESMDC RESCJ PROX PHLX BASE Right 06/12/2020    Procedure: BUNIONECTOMY CHRISTIAN;  Surgeon: Gunner Thomas DPM;  [facility];  Service: Podiatry    DC ELECTRICAL STIMULATION BONE HEALING NONINVASIVE Right 06/12/2020    Procedure: INSERTION BONE STIMULATOR;  Surgeon: Gunner Thomas DPM;  [facility];  Service: Podiatry    DC GLOSSECTOMY <ONE-HALF TONGUE Right 03/04/2020    Procedure: RIGHT PARTIAL GLOSSECTOMY W LASER;  Surgeon: Adam Crowley MD;  [facility];  Service: ENT    DC GLOSSECTOMY <ONE-HALF TONGUE Right 08/31/2022    Procedure: RIGHT PARTIAL GLOSSECTOMY, KTP LASER FROZEN SECTIONS;  Surgeon: Adam Crowley MD;  [facility];  Service: ENT    TOE OSTEOTOMY Left 02/17/2017    Procedure: CLOSING BASE WEDGE OSTEOTOMY 1ST METATARSAL: APPLICATION OF SHORT LEG SPLINT;APPLICTION AND ACTIVATION OF BONE STIMULATOR;  Surgeon: Gunner Thomas DPM;  [facility];  Service:     VAGINAL DELIVERY      X 1    WISDOM TOOTH EXTRACTION      X 4        Allergies:  Penicillins    Medications:  Current Outpatient Medications   Medication Sig Dispense Refill    buPROPion (WELLBUTRIN SR) 100 mg 12 hr tablet TAKE 1 TABLET BY MOUTH TWICE A  tablet 1    butalbital-acetaminophen-caffeine (FIORICET,ESGIC) -40 mg per tablet TAKE 1 TABLET BY MOUTH EVERY 4 HOURS AS NEEDED FOR HEADACHES 30 tablet 0    clobetasol (TEMOVATE) 0.05 % external solution APPLY TWICE A DAY FOR 2 WEEKS, THEN STOP FOR 1 WEEK, REPEAT AS  NEEDED      Milk Thistle 1000 MG CAPS       Multiple Vitamins-Calcium (ONE-A-DAY WOMENS FORMULA PO) Take by mouth daily       Omega-3 Fatty Acids (Fish Oil) 300 MG CAPS Take by mouth      terbinafine (LamISIL) 250 mg tablet Take 1 tablet (250 mg total) by mouth daily for 42 doses ( Take one a day for 2 weeks.  Then, repeat every 2 months) 14 tablet 2    ipratropium (ATROVENT) 0.06 % nasal spray  (Patient not taking: Reported on 3/19/2025)       No current facility-administered medications for this visit.       Social History:  Social History     Socioeconomic History    Marital status: /Civil Union     Spouse name: None    Number of children: None    Years of education: None    Highest education level: None   Occupational History    None   Tobacco Use    Smoking status: Former     Passive exposure: Past    Smokeless tobacco: Never    Tobacco comments:     quit age 16    Vaping Use    Vaping status: Never Used   Substance and Sexual Activity    Alcohol use: Yes     Alcohol/week: 1.0 standard drink of alcohol     Types: 1 Glasses of wine per week     Comment: wine    Drug use: No    Sexual activity: Yes     Partners: Male   Other Topics Concern    None   Social History Narrative    Always uses seat belt    Dental care, regularly    Employed    No advance directives    Occasional caffeine consumption    Recent change in lifestyle    Supportive and safe    Lives with       Social Drivers of Health     Financial Resource Strain: Not on file   Food Insecurity: Not on file   Transportation Needs: No Transportation Needs (11/9/2020)    PRAPARE - Transportation     Lack of Transportation (Medical): No     Lack of Transportation (Non-Medical): No   Physical Activity: Sufficiently Active (11/9/2020)    Exercise Vital Sign     Days of Exercise per Week: 3 days     Minutes of Exercise per Session: 60 min   Stress: Stress Concern Present (11/9/2020)    [employer] - Occupational Stress  "Questionnaire     Feeling of Stress : To some extent   Social Connections: Not on file   Intimate Partner Violence: Not At Risk (11/9/2020)    Humiliation, Afraid, Rape, and Kick questionnaire     Fear of Current or Ex-Partner: No     Emotionally Abused: No     Physically Abused: No     Sexually Abused: No   Housing Stability: Not on file          Review of Systems   Constitutional:  Negative for chills and fever.   Respiratory:  Negative for cough and shortness of breath.    Cardiovascular:  Negative for chest pain.   Gastrointestinal:  Negative for nausea and vomiting.   Musculoskeletal:  Negative for gait problem.   Skin:  Negative for wound.   Allergic/Immunologic: Negative for immunocompromised state.   Neurological:  Negative for weakness and numbness.   Hematological: Negative.    Psychiatric/Behavioral:  Negative for behavioral problems and confusion.          Objective:      Ht 5' 3.35\" (1.609 m) Comment: stated  Wt 70.3 kg (155 lb)   BMI 27.15 kg/m²          Physical Exam  Vitals reviewed.   Constitutional:       General: She is not in acute distress.     Appearance: She is not toxic-appearing or diaphoretic.   HENT:      Head: Normocephalic and atraumatic.   Eyes:      Extraocular Movements: Extraocular movements intact.   Cardiovascular:      Rate and Rhythm: Normal rate and regular rhythm.      Pulses: Normal pulses.           Dorsalis pedis pulses are 2+ on the right side and 2+ on the left side.        Posterior tibial pulses are 2+ on the right side and 2+ on the left side.   Pulmonary:      Effort: Pulmonary effort is normal. No respiratory distress.   Musculoskeletal:         General: No swelling or signs of injury.      Cervical back: Normal range of motion and neck supple.      Right lower leg: No edema.      Left lower leg: No edema.      Right foot: No foot drop.      Left foot: No foot drop.   Skin:     General: Skin is warm.      Capillary Refill: Capillary refill takes less than 2 seconds. "      Coloration: Skin is not cyanotic or mottled.      Findings: No abscess.      Nails: There is no clubbing.      Comments: Mild hypertrophy and discoloration of distal nails on 2nd toes bilaterally.  Mild keratosis at the tip of 2nd toes.  No clinical evidence of tinea pedis.     Neurological:      General: No focal deficit present.      Mental Status: She is alert and oriented to person, place, and time.      Cranial Nerves: No cranial nerve deficit.      Sensory: No sensory deficit.      Motor: No weakness.      Coordination: Coordination normal.   Psychiatric:         Mood and Affect: Mood normal.         Behavior: Behavior normal.         Thought Content: Thought content normal.         Judgment: Judgment normal.

## 2025-03-24 ENCOUNTER — TELEPHONE (OUTPATIENT)
Age: 40
End: 2025-03-24

## 2025-03-24 NOTE — TELEPHONE ENCOUNTER
Pt is requesting an insurance referral for the following:      Test Name / Order Name: 6 month check up    DX Code: K13.21    Date Of Service: 3/25/25    Location/Facility Name/Address/Phone #: [address] [place] [address]    Location / Facility NPI: [id number]    [facility] Phone # To Reach The Patient: [phone]

## 2025-03-24 NOTE — TELEPHONE ENCOUNTER
Pt is requesting an insurance referral for the following:      Test Name / Order Name: eval and treat    DX Code: D48.5    Date Of Service: 3/24/25    Location/Facility Name/Address/Phone #:   Dermatologist in [address], [place] [address]  [phone]    Location / Facility NPI: [id number]     [facility] Phone # To Reach The Patient: [phone]

## 2025-04-03 ENCOUNTER — OFFICE VISIT (OUTPATIENT)
Dept: URGENT CARE | Facility: CLINIC | Age: 40
End: 2025-04-03
Payer: COMMERCIAL

## 2025-04-03 VITALS
OXYGEN SATURATION: 99 % | RESPIRATION RATE: 16 BRPM | HEART RATE: 94 BPM | SYSTOLIC BLOOD PRESSURE: 138 MMHG | TEMPERATURE: 98.1 F | DIASTOLIC BLOOD PRESSURE: 89 MMHG

## 2025-04-03 DIAGNOSIS — S16.1XXA STRAIN OF CERVICAL PORTION OF RIGHT TRAPEZIUS MUSCLE: Primary | ICD-10-CM

## 2025-04-03 PROCEDURE — 96372 THER/PROPH/DIAG INJ SC/IM: CPT

## 2025-04-03 PROCEDURE — 99213 OFFICE O/P EST LOW 20 MIN: CPT

## 2025-04-03 RX ORDER — KETOROLAC TROMETHAMINE 30 MG/ML
30 INJECTION, SOLUTION INTRAMUSCULAR; INTRAVENOUS ONCE
Status: COMPLETED | OUTPATIENT
Start: 2025-04-03 | End: 2025-04-03

## 2025-04-03 RX ORDER — METHYLPREDNISOLONE 4 MG/1
TABLET ORAL
Qty: 21 TABLET | Refills: 0 | Status: SHIPPED | OUTPATIENT
Start: 2025-04-03

## 2025-04-03 RX ADMIN — KETOROLAC TROMETHAMINE 30 MG: 30 INJECTION, SOLUTION INTRAMUSCULAR; INTRAVENOUS at 17:36

## 2025-04-03 NOTE — PATIENT INSTRUCTIONS
You have been given a Toradol shot today. This is an anti-inflammatory medication, similar to ibuprofen. Do not take any other NSAIDs for the next 8 hours. (EX: ibuprofen, Aleve, Motrin, Advil). You may take Tylenol.     Start steroids tomorrow.     Alternate ice and heating pad.  Can also try Lidoderm patches, Icy Hot cream, Biofreeze, Voltaren gel.  Avoid heavy lifting.     Follow-up with PCP in 3-5 days.    Go to the ED for any worsening symptoms.

## 2025-04-03 NOTE — PROGRESS NOTES
[facility]        NAME: Anali Heard is a 39 y.o. female  : 1985    MRN: [id number]  DATE: 2025  TIME: 3:16 PM    Assessment and Plan   Strain of cervical portion of right trapezius muscle [S16.1XXA]  1. Strain of cervical portion of right trapezius muscle  ketorolac (TORADOL) injection 30 mg    methylPREDNISolone 4 MG tablet therapy pack            Patient Instructions     You have been given a Toradol shot today. This is an anti-inflammatory medication, similar to ibuprofen. Do not take any other NSAIDs for the next 8 hours. (EX: ibuprofen, Aleve, Motrin, Advil). You may take Tylenol.     Start steroids tomorrow.     Alternate ice and heating pad.  Can also try Lidoderm patches, Icy Hot cream, Biofreeze, Voltaren gel.  Avoid heavy lifting.     Follow-up with PCP in 3-5 days.    Go to the ED for any worsening symptoms.    If tests are performed, our office will contact you with results only if changes need to made to the care plan discussed with you at the visit. [facility]      Chief Complaint     Chief Complaint   Patient presents with   • Shoulder Pain     Patient states starting yesterday, pain with R shoulder and neck pain. Patient also states some tenderness near her R ear and back of her head. Patient states the pain started out of nowhere.          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 Patient does not feel like she was doing anything in particular that could have caused this pain. This morning she woke up and the pain was radiating down into the back of her right shoulder. Also noticed a lymph node to the posterior right side of her neck. Denies headache, dizziness/lightheadedness, weakness, numbness, and tingling. She does have PMH migraines so took Fioricet just in case however this was not helpful for her.  Also tried taking 800 mg ibuprofen early this morning and applied Icy Hot.       Review of Systems   Review of Systems   Constitutional:  Negative for fever.   Eyes:  Negative for visual disturbance.   Respiratory:  Negative for shortness of breath.    Cardiovascular:  Negative for chest pain.   Gastrointestinal:  Negative for abdominal pain.   Musculoskeletal:  Positive for neck pain.   Skin:  Negative for rash.   Neurological:  Negative for dizziness, weakness, light-headedness, numbness and headaches.         Current Medications       Current Outpatient Medications:   •  methylPREDNISolone 4 MG tablet therapy pack, Use as directed on package, Disp: 21 tablet, Rfl: 0  •  buPROPion (WELLBUTRIN SR) 100 mg 12 hr tablet, TAKE 1 TABLET BY MOUTH TWICE A DAY, Disp: 180 tablet, Rfl: 1  •  butalbital-acetaminophen-caffeine (FIORICET,ESGIC) -40 mg per tablet, TAKE 1 TABLET BY MOUTH EVERY 4 HOURS AS NEEDED FOR HEADACHES, Disp: 30 tablet, Rfl: 0  •  clobetasol (TEMOVATE) 0.05 % external solution, APPLY TWICE A DAY FOR 2 WEEKS, THEN STOP FOR 1 WEEK, REPEAT AS NEEDED, Disp: , Rfl:   •  ipratropium (ATROVENT) 0.06 % nasal spray, , Disp: , Rfl:   •  Milk Thistle 1000 MG CAPS, , Disp: , Rfl:   •  Multiple Vitamins-Calcium (ONE-A-DAY WOMENS FORMULA PO), Take by mouth daily , Disp: , Rfl:   •  Omega-3 Fatty Acids (Fish Oil) 300 MG CAPS, Take by mouth, Disp: , Rfl:   •  terbinafine (LamISIL) 250 mg tablet, Take 1 tablet (250 mg total) by mouth daily for 42 doses ( Take one a day for 2 weeks.  Then, repeat every 2 months), Disp: 14 tablet, Rfl: 2    Current Allergies     Allergies as of 04/03/2025 - Reviewed 04/03/2025   Allergen Reaction Noted   • Penicillins Hives             The following portions of the patient's history were reviewed and updated as appropriate: allergies, current medications, past family history, past medical history, past social history, past surgical history and problem list.     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   • Migraines    • Wears glasses        Past Surgical History:   Procedure Laterality Date   • BREAST SURGERY  11/17/22   • BUNIONECTOMY     • FOOT OSTEOTOMY Left 02/17/2017    Procedure: OSTECTOMY 1ST METATARSAL;  Surgeon: Gunner Thomas DPM;  [facility];  Service:    • FOOT SURGERY     • CA ARTHRODESIS MIDTARSOMETATARSAL SINGLE JOINT Right 06/12/2020    Procedure: ARTHRODESIS / FUSION 1ST TARSOMETATARSAL JOINT;  Surgeon: Gunner Thomas DPM;  [facility];  Service: Podiatry   • CA CORRJ HLX VLGS BNCTY SESMDC RESCJ PROX PHLX BASE Right 06/12/2020    Procedure: BUNIONECTOMY CHRISTIAN;  Surgeon: Gnuner Thomas DPM;  [facility];  Service: Podiatry   • CA ELECTRICAL STIMULATION BONE HEALING NONINVASIVE Right 06/12/2020    Procedure: INSERTION BONE STIMULATOR;  Surgeon: Gunner Thomas DPM;  [facility];  Service: Podiatry   • CA GLOSSECTOMY <ONE-HALF TONGUE Right 03/04/2020    Procedure: RIGHT PARTIAL GLOSSECTOMY W LASER;  Surgeon: Adam Crowley MD;  [facility];  Service: ENT   • CA GLOSSECTOMY <ONE-HALF TONGUE Right 08/31/2022    Procedure: RIGHT PARTIAL GLOSSECTOMY, KTP LASER FROZEN SECTIONS;  Surgeon: Adam Crowley MD;  [facility];  Service: ENT   • TOE OSTEOTOMY Left 02/17/2017    Procedure: CLOSING BASE WEDGE OSTEOTOMY 1ST METATARSAL: APPLICATION OF SHORT LEG SPLINT;APPLICTION AND ACTIVATION OF BONE STIMULATOR;  Surgeon: Gunner Thomas DPM;  [facility];  Service:    • VAGINAL DELIVERY      X 1   • WISDOM TOOTH EXTRACTION      X 4       Family History   Problem Relation Age of Onset   • Hypertension Father    • Heart disease Family         Cardiovascular    • Substance Abuse Neg Hx    • Mental illness Neg Hx          Medications have been verified.        Objective   /89   Pulse 94   Temp 98.1 °F (36.7 °C)   Resp 16   SpO2 99%         Physical Exam     Physical Exam  Vitals and nursing note reviewed.   Constitutional:       General: She is not in acute distress.  HENT:      Head: Normocephalic and atraumatic.      Right Ear: Tympanic membrane, ear canal and external ear normal.      Left Ear: Tympanic membrane, ear canal and external ear normal.      Nose: Nose normal.      Mouth/Throat:      Mouth: Mucous membranes are moist.   Eyes:      Conjunctiva/sclera: Conjunctivae normal.      Pupils: Pupils are equal, round, and reactive to light.   Neck:     Cardiovascular:      Rate and Rhythm: Normal rate and regular rhythm.      Pulses: Normal pulses.      Heart sounds: Normal heart sounds.   Pulmonary:      Effort: Pulmonary effort is normal.      Breath sounds: Normal breath sounds.   Musculoskeletal:      Right hand: Normal strength.      Left hand: Normal strength.      Cervical back: Neck supple. No bony tenderness. Muscular tenderness present. No spinous process tenderness. Decreased range of motion.      Thoracic back: Normal. No tenderness.      Lumbar back: Normal. No tenderness.   Skin:     General: Skin is warm and dry.   Neurological:      Mental Status: She is alert and oriented to person, place, and time.      Gait: Gait normal.

## 2025-04-10 DIAGNOSIS — G43.009 MIGRAINE WITHOUT AURA AND WITHOUT STATUS MIGRAINOSUS, NOT INTRACTABLE: ICD-10-CM

## 2025-04-10 RX ORDER — BUTALBITAL, ACETAMINOPHEN AND CAFFEINE 50; 325; 40 MG/1; MG/1; MG/1
TABLET ORAL
Qty: 30 TABLET | Refills: 1 | Status: SHIPPED | OUTPATIENT
Start: 2025-04-10

## 2025-04-15 ENCOUNTER — HOSPITAL ENCOUNTER (OUTPATIENT)
Dept: RADIOLOGY | Facility: HOSPITAL | Age: 40
Discharge: HOME | End: 2025-04-15
Payer: COMMERCIAL

## 2025-04-15 ENCOUNTER — OFFICE VISIT (OUTPATIENT)
Dept: FAMILY MEDICINE CLINIC | Facility: HOSPITAL | Age: 40
End: 2025-04-15
Payer: COMMERCIAL

## 2025-04-15 VITALS
BODY MASS INDEX: 27.5 KG/M2 | OXYGEN SATURATION: 97 % | DIASTOLIC BLOOD PRESSURE: 82 MMHG | SYSTOLIC BLOOD PRESSURE: 114 MMHG | HEIGHT: 63 IN | WEIGHT: 155.2 LBS | HEART RATE: 76 BPM

## 2025-04-15 DIAGNOSIS — S46.811D STRAIN OF RIGHT TRAPEZIUS MUSCLE, SUBSEQUENT ENCOUNTER: Primary | ICD-10-CM

## 2025-04-15 DIAGNOSIS — M54.2 CERVICALGIA: ICD-10-CM

## 2025-04-15 PROBLEM — S46.811A STRAIN OF RIGHT TRAPEZIUS MUSCLE: Status: ACTIVE | Noted: 2025-04-15

## 2025-04-15 PROCEDURE — 99213 OFFICE O/P EST LOW 20 MIN: CPT | Performed by: NURSE PRACTITIONER

## 2025-04-15 PROCEDURE — 72050 X-RAY EXAM NECK SPINE 4/5VWS: CPT

## 2025-04-15 RX ORDER — CYCLOBENZAPRINE HCL 5 MG
5 TABLET ORAL 3 TIMES DAILY PRN
Qty: 21 TABLET | Refills: 0 | Status: SHIPPED | OUTPATIENT
Start: 2025-04-15

## 2025-04-15 NOTE — PROGRESS NOTES
[facility]   Karley WINSTON    Assessment/Plan:   1. Strain of right trapezius muscle, subsequent encounter  Assessment & Plan:  HX migraines which haven't been troublesome since having her children.  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  Neck pain has exacerbated her  migraines.  Denies any overt injury however was in 1 position for an extended period of time.  Was seen in urgent care on 4/3/2025 provided a Toradol shot as well as a Medrol Dosepak with some improvement.  Continues to use her Fioricet and ibuprofen with occasional heat application.  Did go to the chiropractor yesterday who adjusted and provided some myofascial release.  She denies any further radicular signs however has ongoing right-sided neck pain.  - NAD, VSS.  Cervical tenderness at C6/C7 region with tight right-sided trap.  Notes tenderness with left lateral flexion of head.  + DTR firm hand grasp.  - Will provide Flexeril as needed for muscle spasm.  Discussed side effects with verbalized understanding and will use first around bedtime and not drive while on this medication.  Continue to alternate Tylenol and ibuprofen as well as heat application.  Will check cervical spine x-ray.  If no improvement consider massage/spine PT.  Orders:  -     cyclobenzaprine (FLEXERIL) 5 mg tablet; Take 1 tablet (5 mg total) by mouth 3 (three) times a day as needed for muscle spasms  -     Ambulatory Referral to [facility]; Future  2. Cervicalgia  -     Ambulatory Referral to [facility]; Future  -     XR spine cervical complete 4 or 5 vw non injury; Future; Expected date: 04/15/2025      No follow-ups on file.  Patient may call or return to office with any questions or concerns.     ______________________________________________________________________  Subjective:     Patient ID: Anali Heard is a 39 y.o. female.  Anali Heard  Chief Complaint    Patient presents with    Neck Pain     Saw  on 4/3/25- pain is better but still there. Symptoms stared 4/2/25    Headache     Every day.      HPI         The following portions of the patient's history were reviewed and updated as appropriate: allergies, current medications, past family history, past medical history, past social history, past surgical history, and problem list.    Review of Systems   Constitutional: Negative.  Negative for activity change, appetite change, chills, fatigue and fever.   HENT: Negative.  Negative for congestion, ear pain, postnasal drip and sinus pain.    Eyes: Negative.    Respiratory: Negative.  Negative for cough and shortness of breath.    Cardiovascular: Negative.  Negative for chest pain and leg swelling.   Gastrointestinal: Negative.  Negative for constipation and diarrhea.   Endocrine: Negative.    Genitourinary: Negative.  Negative for dysuria.   Musculoskeletal:  Positive for neck pain.   Skin: Negative.    Allergic/Immunologic: Negative.  Negative for immunocompromised state.   Neurological:  Positive for headaches. Negative for dizziness and light-headedness.   Hematological: Negative.    Psychiatric/Behavioral: Negative.           Objective:      Vitals:    04/15/25 1246   BP: 114/82   Pulse: 76   SpO2: 97%      Physical Exam  Vitals and nursing note reviewed.   Constitutional:       Appearance: Normal appearance.   HENT:      Head: Normocephalic and atraumatic.      Right Ear: Tympanic membrane, ear canal and external ear normal.      Left Ear: Tympanic membrane, ear canal and external ear normal.      Nose: Nose normal.      Mouth/Throat:      Mouth: Mucous membranes are moist.      Pharynx: Oropharynx is clear.   Eyes:      Extraocular Movements: Extraocular movements intact.      Conjunctiva/sclera: Conjunctivae normal.      Pupils: Pupils are equal, round, and reactive to light.   Cardiovascular:      Rate and Rhythm: Normal rate and regular rhythm.      Pulses:  "Normal pulses.      Heart sounds: Normal heart sounds.   Pulmonary:      Effort: Pulmonary effort is normal.      Breath sounds: Normal breath sounds.   Abdominal:      General: Bowel sounds are normal.      Palpations: Abdomen is soft.   Musculoskeletal:         General: Normal range of motion.      Cervical back: Normal range of motion and neck supple. Spasms, tenderness and bony tenderness present. Pain with movement present.        Back:       Comments: Cervical tenderness as well as right trap tenderness with palpation.  Tenderness with left lateral flexion.   Skin:     General: Skin is warm and dry.   Neurological:      General: No focal deficit present.      Mental Status: She is alert and oriented to person, place, and time.      Deep Tendon Reflexes: Reflexes are normal and symmetric.   Psychiatric:         Mood and Affect: Mood normal.         Behavior: Behavior normal.         Thought Content: Thought content normal.         Judgment: Judgment normal.           Portions of the record may have been created with voice recognition software. Occasional wrong word or \"sound alike\" substitutions may have occurred due to the inherent limitations of voice recognition software. Please review the chart carefully and recognize, using context, where substitutions/typographical errors may have occurred.       "

## 2025-04-15 NOTE — ASSESSMENT & PLAN NOTE
HX migraines which haven't been troublesome since having her children.  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  Neck pain has exacerbated her  migraines.  Denies any overt injury however was in 1 position for an extended period of time.  Was seen in urgent care on 4/3/2025 provided a Toradol shot as well as a Medrol Dosepak with some improvement.  Continues to use her Fioricet and ibuprofen with occasional heat application.  Did go to the chiropractor yesterday who adjusted and provided some myofascial release.  She denies any further radicular signs however has ongoing right-sided neck pain.  - NAD, VSS.  Cervical tenderness at C6/C7 region with tight right-sided trap.  Notes tenderness with left lateral flexion of head.  + DTR firm hand grasp.  - Will provide Flexeril as needed for muscle spasm.  Discussed side effects with verbalized understanding and will use first around bedtime and not drive while on this medication.  Continue to alternate Tylenol and ibuprofen as well as heat application.  Will check cervical spine x-ray.  If no improvement consider massage/spine PT.

## 2025-04-15 NOTE — PATIENT INSTRUCTIONS
Use of flexeril as needed for muscle spasm.    Please consider massage/spine PT   Continue to alternate tylenol and ibuprofen.    Complete cervical spine Xray

## 2025-04-18 ENCOUNTER — RESULTS FOLLOW-UP (OUTPATIENT)
Dept: FAMILY MEDICINE CLINIC | Facility: HOSPITAL | Age: 40
End: 2025-04-18

## 2025-05-14 ENCOUNTER — TELEPHONE (OUTPATIENT)
Age: 40
End: 2025-05-14

## 2025-05-14 NOTE — TELEPHONE ENCOUNTER
Patient is requesting an insurance referral for the following specialty:      Test Name / Order Name: weight management    DX Code: R63.5 and follow up    Date Of Service: 05/15/25    Location/Facility Name/Address/Phone #: [facility] [address] [place] [address] [phone]     Location / Facility NPI:     Best Phone # To Reach The Patient: [phone]

## 2025-05-15 ENCOUNTER — OFFICE VISIT (OUTPATIENT)
Age: 40
End: 2025-05-15
Payer: COMMERCIAL

## 2025-05-15 VITALS
SYSTOLIC BLOOD PRESSURE: 116 MMHG | HEART RATE: 83 BPM | HEIGHT: 63 IN | TEMPERATURE: 97.4 F | WEIGHT: 155.4 LBS | BODY MASS INDEX: 27.54 KG/M2 | DIASTOLIC BLOOD PRESSURE: 82 MMHG

## 2025-05-15 DIAGNOSIS — E66.3 OVERWEIGHT: Primary | ICD-10-CM

## 2025-05-15 DIAGNOSIS — R73.01 IFG (IMPAIRED FASTING GLUCOSE): ICD-10-CM

## 2025-05-15 PROCEDURE — 99213 OFFICE O/P EST LOW 20 MIN: CPT | Performed by: PHYSICIAN ASSISTANT

## 2025-05-15 RX ORDER — TOPIRAMATE 50 MG/1
TABLET, FILM COATED ORAL
Qty: 30 TABLET | Refills: 3 | Status: SHIPPED | OUTPATIENT
Start: 2025-05-15

## 2025-05-15 RX ORDER — PHENTERMINE HYDROCHLORIDE 15 MG/1
15 CAPSULE ORAL EVERY MORNING
Qty: 30 CAPSULE | Refills: 1 | Status: SHIPPED | OUTPATIENT
Start: 2025-05-15

## 2025-05-15 NOTE — PROGRESS NOTES
Assessment/Plan:  Anali was seen today for follow-up.    Diagnoses and all orders for this visit:    Overweight  -     phentermine 15 MG capsule; Take 1 capsule (15 mg total) by mouth every morning  -     topiramate (Topamax) 50 MG tablet; Take 1/2 tablet (25mg) by mouth each evening for 7 days, then increase to 1 tablet (50mg) by mouth each evening thereafter    IFG (impaired fasting glucose)           Initial: 158 lbs (1/30/25)  Current: 155 lbs (5/15/25)  Change: -3 lbs  Goal:  140-145 lbs     - Weight not at goal  - Patient is interested in Conservative Program  - Labs reviewed: As below.    General Recommendations:  Nutrition:  Eat breakfast daily.  Do not skip meals.     Food log (ie.) www.[employer].com, sparkpeople.com, loseit.com, calorieking.com, etc.    Practice mindful eating.  Be sure to set aside time to eat, eat slowly, and savor your food.    Hydration:    At least 64oz of water daily.  No sugar sweetened beverages.  No juice (eat the fruit instead).    Exercise:  Studies have shown that the ideal exercise goal is somewhere between 150 to 300 minutes of moderate intensity exercise a week.  Start with exercising 10 minutes every other day and gradually increase physical activity with a goal of at least 150 minutes of moderate intensity exercise a week, divided over at least 3 days a week.  An example of this would be exercising 30 minutes a day, 5 days a week.  Resistance training can increase muscle mass and increase our resting metabolic rate.   FULL BODY resistance training is recommended 2-3 times a week.  Do not do this on consecutive days to allow for muscle recovery.    Aim for a bare minimum 5000 steps, even on days you do not exercise.    Monitoring:   Weigh yourself daily.  If this causes undue stress, then just weigh yourself once a week.  Weigh yourself the same time of the day with the same amount of clothing on.  Preferably this should be done after waking up, before you eat, and  with no clothing or minimal clothing on.    Calorie goal:  7113-5811 ximena/day (Provided with meal plan to follow).    Return visit:    Calorie tracking/deficit  - protein and calorie goals  Physical activity goals - keep up the good step count!  AOM  Contraception:  had a vasectomy   No AOM contraindications  Wellbutrin without much suppression - will taper off.  Discussed qsymia versus saxenda.  Will trial qsymia. Use and side effect profile reviewed. Phentermine + topamax sent.   RTC: 4 months        ______________________________________________________________________        Subjective:   Chief Complaint   Patient presents with    Follow-up     MWM F/U----WAIST: 35in       HPI: Anali Heard  is a 39 y.o. female with history of migraines,  and excess weight, here to pursue weight loss management.  Previous notes and records have been reviewed.    TSH WNL  Fasting glucose 101    Patient reports an issue for the past 7 years since having her second kid  Prepregnancy 137 lbs  Went back down to 137 lbs   Then 1 year thereafter began slowly gaining weight.     Dietary changes - skip breakfast, increase protein  Exercise - walking + resistance training    Does feel like she stress/emotional eats    GOAL: 140-145 lbs     Started on wellbutrin 2/2025  Current dose: 100mg BID    Felt like he had a difficult time sleeping with extended release dose.  Did not notice much appetite nor craving suppression.        HPI  Wt Readings from Last 20 Encounters:   05/15/25 70.5 kg (155 lb 6.4 oz)   04/15/25 70.4 kg (155 lb 3.2 oz)   03/19/25 70.3 kg (155 lb)   03/11/25 71 kg (156 lb 9.6 oz)   01/30/25 71.9 kg (158 lb 9.6 oz)   09/10/24 72.1 kg (159 lb)   03/31/24 67.2 kg (148 lb 3.2 oz)   03/28/24 66.7 kg (147 lb)   12/17/23 68 kg (150 lb)   12/06/23 68.9 kg (152 lb)   09/14/23 66.2 kg (146 lb)   12/08/22 66.2 kg (146 lb)   12/05/22 66.2 kg (146 lb)   09/15/22 64.9 kg (143 lb)   08/31/22 64.9 kg (143 lb)   07/28/22 64.9 kg  (143 lb)   06/16/22 64.9 kg (143 lb)   03/31/22 64.9 kg (143 lb)   12/01/21 64 kg (141 lb)   09/20/21 65.8 kg (145 lb)       Food logging:  B: SKIP or protein shake  S:  L: yogurt  S: pretzels   D: protein (deer, chicken) + vegetable (broccoli, greenbeans) +starch (minimal potato, corn)  S: skip       Hydration: water         Coffee 1-2 cups + SF syrup + collagen   Occassional diet soda  Alcohol: 0-3/4 / week      Exercise: Walking 10,000 day    Resistance  - at home TV 30 minutes, 5-8lbs. 2-3x/week     Sleep:  Better since taking magnesium. [employer] 6-7 hours   Occupation: Works at [employer]       Past Medical History:   Diagnosis Date    Acquired hallux valgus of right foot     Bunion     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    Migraines     Wears glasses      Patient denies personal and family history of  pancreatitis, thyroid cancer, MEN-2 tumors.  Denies any hx of glaucoma, seizures, kidney stones, gallstones.  Denies Hx of CAD, PAD, palpitations, arrhythmia.   Denies uncontrolled anxiety or depression, suicidal behavior or thinking , insomnia or sleep disturbance.     Past Surgical History:   Procedure Laterality Date    BREAST SURGERY  11/17/22    BUNIONECTOMY      FOOT OSTEOTOMY Left 02/17/2017    Procedure: OSTECTOMY 1ST METATARSAL;  Surgeon: Gunner Thomas DPM;  [facility];  Service:     FOOT SURGERY      IN ARTHRODESIS MIDTARSOMETATARSAL SINGLE JOINT Right 06/12/2020    Procedure: ARTHRODESIS / FUSION 1ST TARSOMETATARSAL JOINT;  Surgeon: Gunner Thomas DPM;  [facility];  Service: Podiatry    IN CORRJ HLX VLGS BNCTY SESMDC RESCJ PROX PHLX BASE Right 06/12/2020    Procedure: BUNIONECTOMY CHRISTIAN;  Surgeon: Gunner Thomas DPM;  [facility];  Service: Podiatry    IN ELECTRICAL STIMULATION BONE HEALING NONINVASIVE Right 06/12/2020    Procedure: INSERTION BONE STIMULATOR;  Surgeon: Gunner Thomas DPM;  [facility];  Service: Podiatry    IN GLOSSECTOMY <ONE-HALF TONGUE  "Right 03/04/2020    Procedure: RIGHT PARTIAL GLOSSECTOMY W LASER;  Surgeon: Adam Crowley MD;  [facility];  Service: ENT    LA GLOSSECTOMY <ONE-HALF TONGUE Right 08/31/2022    Procedure: RIGHT PARTIAL GLOSSECTOMY, KTP LASER FROZEN SECTIONS;  Surgeon: Adam Crowley MD;  [facility];  Service: ENT    TOE OSTEOTOMY Left 02/17/2017    Procedure: CLOSING BASE WEDGE OSTEOTOMY 1ST METATARSAL: APPLICATION OF SHORT LEG SPLINT;APPLICTION AND ACTIVATION OF BONE STIMULATOR;  Surgeon: Gunner Thomas DPM;  Location:  MAIN OR;  Service:     VAGINAL DELIVERY      X 1    WISDOM TOOTH EXTRACTION      X 4     The following portions of the patient's history were reviewed and updated as appropriate: allergies, current medications, past family history, past medical history, past social history, past surgical history, and problem list.    Review Of Systems:  Review of Systems   Constitutional:  Negative for fatigue and fever.   HENT:  Negative for sore throat, trouble swallowing and voice change.    Respiratory:  Negative for shortness of breath.    Cardiovascular:  Negative for chest pain.   Gastrointestinal:  Negative for abdominal pain, constipation, diarrhea, nausea and vomiting.   Endocrine: Negative for cold intolerance and heat intolerance.   Genitourinary:  Negative for difficulty urinating.   Musculoskeletal:  Negative for arthralgias and back pain.   Psychiatric/Behavioral:  Negative for suicidal ideas. The patient is not nervous/anxious.    All other systems reviewed and are negative.      Objective:  /82   Pulse 83   Temp (!) 97.4 °F (36.3 °C) (Tympanic)   Ht 5' 3\" (1.6 m)   Wt 70.5 kg (155 lb 6.4 oz)   BMI 27.53 kg/m²   Physical Exam  Vitals and nursing note reviewed.   Constitutional:       General: She is not in acute distress.     Appearance: Normal appearance. She is obese. She is not ill-appearing, toxic-appearing or diaphoretic.   HENT:      Head: Normocephalic and atraumatic.     Eyes:      " General:         Right eye: No discharge.         Left eye: No discharge.      Conjunctiva/sclera: Conjunctivae normal.     Pulmonary:      Effort: Pulmonary effort is normal. No respiratory distress.     Musculoskeletal:         General: Normal range of motion.      Cervical back: Normal range of motion. No rigidity.      Right lower leg: No edema.      Left lower leg: No edema.     Skin:     Coloration: Skin is not pale.      Findings: No erythema or rash.     Neurological:      General: No focal deficit present.      Mental Status: She is alert.     Psychiatric:         Mood and Affect: Mood normal.         Labs and Imaging  Recent labs and imaging have been personally reviewed.  Lab Results   Component Value Date    WBC 5.7 09/03/2024    HGB 13.5 09/03/2024    HCT 41.3 09/03/2024    MCV 88 09/03/2024     09/03/2024     Lab Results   Component Value Date    SODIUM 140 03/08/2025    K 4.7 03/08/2025     03/08/2025    CO2 23 03/08/2025    BUN 18 03/08/2025    CREATININE 0.86 03/08/2025    GLUC 100 (H) 03/08/2025    AST 24 03/08/2025    ALT 20 03/08/2025    TP 7.0 03/08/2025    TBILI 0.3 03/08/2025    EGFR 88 03/08/2025     Lab Results   Component Value Date    HGBA1C 5.4 03/08/2025     Lab Results   Component Value Date    TSH 1.630 03/17/2025     Lab Results   Component Value Date    CHOLESTEROL 241 (H) 03/08/2025     Lab Results   Component Value Date    HDL 87 03/08/2025     Lab Results   Component Value Date    TRIG 61 03/08/2025     Lab Results   Component Value Date    LDLCALC 144 (H) 03/08/2025

## 2025-08-19 DIAGNOSIS — E66.3 OVERWEIGHT: ICD-10-CM

## 2025-08-19 RX ORDER — PHENTERMINE HYDROCHLORIDE 15 MG/1
CAPSULE ORAL
Qty: 30 CAPSULE | Refills: 1 | Status: SHIPPED | OUTPATIENT
Start: 2025-08-19

## (undated) DEVICE — STRETCH BANDAGE: Brand: CURITY

## (undated) DEVICE — NEEDLE 25G X 1 1/2

## (undated) DEVICE — SUT VICRYL 4-0 SH 27 IN J415H

## (undated) DEVICE — SUT SILK 3-0 SH 30 IN K832H

## (undated) DEVICE — GLOVE SRG BIOGEL 7

## (undated) DEVICE — SUT MONOCRYL 4-0 PS-2 27 IN Y426H

## (undated) DEVICE — DRAPE C-ARM X-RAY

## (undated) DEVICE — SUT VICRYL 3-0 SH C/R 18 IN J864D

## (undated) DEVICE — INTENDED FOR TISSUE SEPARATION, AND OTHER PROCEDURES THAT REQUIRE A SHARP SURGICAL BLADE TO PUNCTURE OR CUT.: Brand: BARD-PARKER SAFETY BLADES SIZE 15, STERILE

## (undated) DEVICE — GLOVE INDICATOR PI UNDERGLOVE SZ 7.5 BLUE

## (undated) DEVICE — Device

## (undated) DEVICE — BIPOLAR CORD DISP

## (undated) DEVICE — CHLORAPREP HI-LITE 26ML ORANGE

## (undated) DEVICE — SUT VICRYL 3-0 SH 27 IN J416H

## (undated) DEVICE — CAP PROTECTIVE 0.062IN TPR FIT

## (undated) DEVICE — SPECIMEN CONTAINER STERILE PEEL PACK

## (undated) DEVICE — BETHLEHEM UNIVERSAL  MIONR EXT: Brand: CARDINAL HEALTH

## (undated) DEVICE — TELFA NON-ADHERENT ABSORBENT DRESSING: Brand: TELFA

## (undated) DEVICE — WEBRIL 6 IN UNSTERILE

## (undated) DEVICE — GLOVE INDICATOR PI UNDERGLOVE SZ 8 BLUE

## (undated) DEVICE — SUT VICRYL 2-0 SH 27 IN UNDYED J417H

## (undated) DEVICE — SUT ETHILON 4-0 PS-2 18 IN 1667H

## (undated) DEVICE — SKIN MARKER DUAL TIP WITH RULER CAP, FLEXIBLE RULER AND LABELS: Brand: DEVON

## (undated) DEVICE — COBAN 4 IN STERILE

## (undated) DEVICE — GUIDEWIRE 0.86MM TROCAR THD

## (undated) DEVICE — CUFF TOURNIQUET 18 X 4 IN QUICK CONNECT DISP 1 BLADDER

## (undated) DEVICE — ACE WRAP 6 IN UNSTERILE

## (undated) DEVICE — CAST PADDING 4 IN SYNTHETIC NON-STRL

## (undated) DEVICE — BONE STIMULATOR NON INVASIVE

## (undated) DEVICE — INTENDED FOR TISSUE SEPARATION, AND OTHER PROCEDURES THAT REQUIRE A SHARP SURGICAL BLADE TO PUNCTURE OR CUT.: Brand: BARD-PARKER ® CARBON RIB-BACK BLADES

## (undated) DEVICE — ACE WRAP 4 IN UNSTERILE

## (undated) DEVICE — SYRINGE 10ML LL

## (undated) DEVICE — GLOVE SRG BIOGEL 6.5

## (undated) DEVICE — BONE WAX WHITE: Brand: BONE WAX WHITE

## (undated) DEVICE — CRADLE EXTREMITY UNIVERSAL CONTOURED

## (undated) DEVICE — GLOVE SRG BIOGEL 8

## (undated) DEVICE — BULB SYRINGE,IRRIGATION WITH PROTECTIVE CAP: Brand: DOVER

## (undated) DEVICE — SPLINT ORTHO-GLASS 4IN X 15FT

## (undated) DEVICE — CURITY NON-ADHERENT STRIPS: Brand: CURITY

## (undated) DEVICE — GLOVE SRG BIOGEL 7.5

## (undated) DEVICE — PADDING CAST 4 IN  COTTON STRL

## (undated) DEVICE — NEEDLE 18 G X 1 1/2

## (undated) DEVICE — GLOVE INDICATOR PI UNDERGLOVE SZ 6.5 BLUE

## (undated) DEVICE — MARGIN MARKER SET

## (undated) DEVICE — PLUMEPEN PRO 10FT

## (undated) DEVICE — 10FR FRAZIER SUCTION HANDLE: Brand: CARDINAL HEALTH

## (undated) DEVICE — OCCLUSIVE GAUZE STRIP,3% BISMUTH TRIBROMOPHENATE IN PETROLATUM BLEND: Brand: XEROFORM

## (undated) DEVICE — BLADE SAGITTAL 25.6 X 9.5MM

## (undated) DEVICE — SUT VICRYL 4-0 PS-2 27 IN J426H

## (undated) DEVICE — POSITION CUSHION INSERT LARGE PRONEVIEW

## (undated) DEVICE — 3M™ STERI-STRIP™ REINFORCED ADHESIVE SKIN CLOSURES, R1547, 1/2 IN X 4 IN (12 MM X 100 MM), 6 STRIPS/ENVELOPE: Brand: 3M™ STERI-STRIP™

## (undated) DEVICE — BANDAGE, ESMARK LF STR 4"X9'(20/CS): Brand: CYPRESS

## (undated) DEVICE — 2000CC GUARDIAN II: Brand: GUARDIAN

## (undated) DEVICE — STOCKINETTE REGULAR

## (undated) DEVICE — FIBER LASER ENT ELEVATE ELITE

## (undated) DEVICE — K-WIRE 1.1MM X 9INL SMTH XMOND/XMOND
Type: IMPLANTABLE DEVICE | Status: NON-FUNCTIONAL
Removed: 2017-02-17

## (undated) DEVICE — SPONGE LAP 18 X 18 IN STRL RFD

## (undated) DEVICE — STERILE BETHLEHEM T AND A PACK: Brand: CARDINAL HEALTH

## (undated) DEVICE — TAPE CAST 4IN FIBERGLASS 4YD WHITE

## (undated) DEVICE — SYRINGE CATH TIP 50ML

## (undated) DEVICE — KERLIX BANDAGE ROLL: Brand: KERLIX

## (undated) DEVICE — DRAPE C-ARMOUR

## (undated) DEVICE — SCD SEQUENTIAL COMPRESSION COMFORT SLEEVE MEDIUM KNEE LENGTH: Brand: KENDALL SCD

## (undated) DEVICE — PREP SURGICAL PURPREP 26ML

## (undated) DEVICE — STERILE SLQ FOOT ANKLE PACK: Brand: CARDINAL HEALTH

## (undated) DEVICE — BLADE SAW 11 X 40MM LAPIPLASTY LINVATEC

## (undated) DEVICE — GLOVE INDICATOR PI UNDERGLOVE SZ 8.5 BLUE

## (undated) DEVICE — REM POLYHESIVE ADULT PATIENT RETURN ELECTRODE: Brand: VALLEYLAB

## (undated) DEVICE — TUBING SUCTION 5MM X 12 FT

## (undated) DEVICE — PREP PAD BNS: Brand: CONVERTORS

## (undated) DEVICE — POV-IOD SOLUTION 4OZ BT